# Patient Record
Sex: MALE | Race: WHITE | Employment: OTHER | RURAL
[De-identification: names, ages, dates, MRNs, and addresses within clinical notes are randomized per-mention and may not be internally consistent; named-entity substitution may affect disease eponyms.]

---

## 2017-04-28 RX ORDER — LISINOPRIL 10 MG/1
10 TABLET ORAL DAILY
Qty: 90 TAB | Refills: 0 | Status: SHIPPED | OUTPATIENT
Start: 2017-04-28 | End: 2017-07-20 | Stop reason: SDUPTHER

## 2017-07-20 RX ORDER — SIMVASTATIN 20 MG/1
TABLET, FILM COATED ORAL
Qty: 30 TAB | Refills: 11 | Status: SHIPPED | OUTPATIENT
Start: 2017-07-20 | End: 2017-09-15 | Stop reason: SDUPTHER

## 2017-07-20 RX ORDER — LISINOPRIL 10 MG/1
10 TABLET ORAL DAILY
Qty: 90 TAB | Refills: 0 | Status: SHIPPED | OUTPATIENT
Start: 2017-07-20 | End: 2017-09-15 | Stop reason: SDUPTHER

## 2017-07-20 NOTE — TELEPHONE ENCOUNTER
: Please call pt to set up appt. per Juan Thao needs wellness exam and labs after 08/05/17 prior to refills.

## 2017-09-15 ENCOUNTER — OFFICE VISIT (OUTPATIENT)
Dept: FAMILY MEDICINE CLINIC | Age: 72
End: 2017-09-15

## 2017-09-15 VITALS
OXYGEN SATURATION: 96 % | TEMPERATURE: 98.9 F | RESPIRATION RATE: 16 BRPM | SYSTOLIC BLOOD PRESSURE: 132 MMHG | DIASTOLIC BLOOD PRESSURE: 70 MMHG | HEART RATE: 60 BPM | BODY MASS INDEX: 27.16 KG/M2 | HEIGHT: 66 IN | WEIGHT: 169 LBS

## 2017-09-15 DIAGNOSIS — E78.00 HYPERCHOLESTEROLEMIA: ICD-10-CM

## 2017-09-15 DIAGNOSIS — Z96.642 STATUS POST LEFT HIP REPLACEMENT: ICD-10-CM

## 2017-09-15 DIAGNOSIS — Z00.00 MEDICARE ANNUAL WELLNESS VISIT, SUBSEQUENT: Primary | ICD-10-CM

## 2017-09-15 DIAGNOSIS — R73.02 GLUCOSE INTOLERANCE (IMPAIRED GLUCOSE TOLERANCE): ICD-10-CM

## 2017-09-15 DIAGNOSIS — I10 ESSENTIAL HYPERTENSION: ICD-10-CM

## 2017-09-15 DIAGNOSIS — R35.1 NOCTURIA: ICD-10-CM

## 2017-09-15 RX ORDER — LISINOPRIL 10 MG/1
10 TABLET ORAL DAILY
Qty: 90 TAB | Refills: 0 | Status: SHIPPED | OUTPATIENT
Start: 2017-09-15 | End: 2017-10-09 | Stop reason: SDUPTHER

## 2017-09-15 RX ORDER — AMLODIPINE BESYLATE 10 MG/1
10 TABLET ORAL DAILY
Qty: 90 TAB | Refills: 3 | Status: SHIPPED | OUTPATIENT
Start: 2017-09-15 | End: 2018-03-22 | Stop reason: SDUPTHER

## 2017-09-15 RX ORDER — SIMVASTATIN 20 MG/1
TABLET, FILM COATED ORAL
Qty: 90 TAB | Refills: 3 | Status: SHIPPED | OUTPATIENT
Start: 2017-09-15 | End: 2018-03-22 | Stop reason: SDUPTHER

## 2017-09-15 NOTE — PATIENT INSTRUCTIONS

## 2017-09-15 NOTE — PROGRESS NOTES
This is a Subsequent Medicare Annual Wellness Exam (AWV) (Performed 12 months after IPPE or effective date of Medicare Part B enrollment, Once in a lifetime)    I have reviewed the patient's medical history in detail and updated the computerized patient record. History     Past Medical History:   Diagnosis Date    Hypercholesterolemia     Hypertension     Hypokalemia       Past Surgical History:   Procedure Laterality Date    ENDOSCOPY, COLON, DIAGNOSTIC      HX CYST REMOVAL      rt foot x2    HX ORTHOPAEDIC      left ankle fx  rt ankle fx     Current Outpatient Prescriptions   Medication Sig Dispense Refill    simvastatin (ZOCOR) 20 mg tablet Take 1 Tab by mouth nightly. Indications: MIXED HYPERLIPIDEMIA 30 Tab 11    lisinopril (PRINIVIL, ZESTRIL) 10 mg tablet Take 1 Tab by mouth daily. 90 Tab 0    amLODIPine (NORVASC) 10 mg tablet Take 1 Tab by mouth daily. 80 Tab 3     No Known Allergies  Family History   Problem Relation Age of Onset    Diabetes Mother     Dementia Mother     Heart Failure Father     Obesity Brother     Hypertension Brother      Social History   Substance Use Topics    Smoking status: Never Smoker    Smokeless tobacco: Never Used    Alcohol use 7.5 - 9.0 oz/week     15 - 18 drink(s) per week      Comment: mariely     Patient Active Problem List   Diagnosis Code    Hypertension I10    Hypercholesterolemia E78.00    Hypokalemia E87.6    Status post left hip replacement Z96.642    Stress due to illness of family member Z63.79       Depression Risk Factor Screening:     PHQ over the last two weeks 9/15/2017   PHQ Not Done -   Little interest or pleasure in doing things Not at all   Feeling down, depressed or hopeless Not at all   Total Score PHQ 2 0     Alcohol Risk Factor Screening: You average more than 14 drinks a week. Functional Ability and Level of Safety:   Hearing Loss  Minor loss, especially with background noise.      Activities of Daily Living  The home contains: no safety equipment  Patient does total self care    Fall RiskFall Risk Assessment, last 12 mths 9/15/2017   Able to walk? Yes   Fall in past 12 months?  No       Abuse Screen  Patient is not abused    Cognitive Screening   Evaluation of Cognitive Function:  Has your family/caregiver stated any concerns about your memory: no      Patient Care Team   Patient Care Team:  SIL Iglesias as PCP - General    Assessment/Plan   Education and counseling provided:  Are appropriate based on today's review and evaluation        Health Maintenance Due   Topic Date Due    DTaP/Tdap/Td series (1 - Tdap) 11/27/1964    GLAUCOMA SCREENING Q2Y  11/27/2008    INFLUENZA AGE 9 TO ADULT  08/01/2017    MEDICARE YEARLY EXAM  08/06/2017

## 2017-09-15 NOTE — MR AVS SNAPSHOT
Visit Information Date & Time Provider Department Dept. Phone Encounter #  
 9/15/2017  1:30 PM [facility] [phone] [id number] Upcoming Health Maintenance Date Due DTaP/Tdap/Td series (1 - Tdap) 11/27/1964 GLAUCOMA SCREENING Q2Y 11/27/2008 INFLUENZA AGE 9 TO ADULT 8/1/2017 MEDICARE YEARLY EXAM 8/6/2017 COLONOSCOPY 4/13/2021 Allergies as of 9/15/2017  Review Complete On: 9/15/2017 By: SIL Jones No Known Allergies Current Immunizations  Reviewed on 9/15/2017 Name Date Influenza High Dose Vaccine PF 9/26/2016 Influenza Vaccine 10/1/2014  1:41 PM, 9/17/2013, 9/17/2012 Influenza Vaccine Sal Dubonnet) 10/1/2015 Pneumococcal Conjugate (PCV-13) 8/5/2016 Pneumococcal Polysaccharide (PPSV-23) 9/17/2013 Zoster Vaccine, Live 10/13/2009 Reviewed by SIL Jones on 9/15/2017 at  1:34 PM  
You Were Diagnosed With   
  
 Codes Comments Medicare annual wellness visit, subsequent    -  Primary ICD-10-CM: Z00.00 ICD-9-CM: V70.0 Essential hypertension     ICD-10-CM: I10 
ICD-9-CM: 401.9 Hypercholesterolemia     ICD-10-CM: E78.00 ICD-9-CM: 272.0 Status post left hip replacement     ICD-10-CM: V41.727 ICD-9-CM: V43.64 Glucose intolerance (impaired glucose tolerance)     ICD-10-CM: R73.02 
ICD-9-CM: 790.22 Nocturia     ICD-10-CM: R35.1 ICD-9-CM: 788.43 Vitals BP Pulse Temp Resp Height(growth percentile) 132/70 (BP 1 Location: Left arm, BP Patient Position: Sitting) 60 98.9 °F (37.2 °C) (Temporal) 16 5' 6\" (1.676 m) Weight(growth percentile) SpO2 BMI Smoking Status 169 lb (76.7 kg) 96% 27.28 kg/m2 Never Smoker Vitals History BMI and BSA Data Body Mass Index Body Surface Area  
 27.28 kg/m 2 1.89 m 2 Preferred Pharmacy Pharmacy Name Phone  [facility] [facility], [address] Ricka Panda [phone] Your Updated Medication List  
  
   
This list is accurate as of: 9/15/17  1:57 PM.  Always use your most recent med list. amLODIPine 10 mg tablet Commonly known as:  Grubbs Inks Take 1 Tab by mouth daily. lisinopril 10 mg tablet Commonly known as:  Kavita Drought Take 1 Tab by mouth daily. simvastatin 20 mg tablet Commonly known as:  ZOCOR Take 1 Tab by mouth nightly. Indications: MIXED HYPERLIPIDEMIA Prescriptions Sent to Pharmacy Refills  
 lisinopril (PRINIVIL, ZESTRIL) 10 mg tablet 0 Sig: Take 1 Tab by mouth daily. Class: Normal  
 Pharmacy: [address] [place] [address], [address] [place], [place] [address]phyllis Hester Ph #: [phone] Route: Oral  
 amLODIPine (NORVASC) 10 mg tablet 3 Sig: Take 1 Tab by mouth daily. Class: Normal  
 Pharmacy: [address] [place] [address], [address] [place], [place] [address]phyllis Hester Ph #: [phone] Route: Oral  
 simvastatin (ZOCOR) 20 mg tablet 3 Sig: Take 1 Tab by mouth nightly. Indications: MIXED HYPERLIPIDEMIA Class: Normal  
 Pharmacy: [address] [place] [address], [address] [place], [place] [address]phyllis Hester Ph #: [phone] We Performed the Following CBC WITH AUTOMATED DIFF [53267 CPT(R)] HEMOGLOBIN A1C WITH EAG [47225 CPT(R)] LIPID PANEL WITH LDL/HDL RATIO [38056 CPT(R)] METABOLIC PANEL, COMPREHENSIVE [76560 CPT(R)] PSA, DIAGNOSTIC (PROSTATE SPECIFIC AG) [id number] CPT(R)] Patient Instructions Medicare Wellness Visit, Male The best way to live healthy is to have a healthy lifestyle by eating a well-balanced diet, exercising regularly, limiting alcohol and stopping smoking. Regular physical exams and screening tests are another way to keep healthy. Preventive exams provided by your health care provider can find health problems before they become diseases or illnesses.  Preventive services including immunizations, screening tests, monitoring and exams can help you take care of your own health. All people over age 72 should have a pneumovax  and and a prevnar shot to prevent pneumonia. These are once in a lifetime unless you and your provider decide differently. All people over 65 should have a yearly flu shot and a tetanus vaccine every 10 years. Screening for diabetes mellitus with a blood sugar test should be done every year. Glaucoma is a disease of the eye due to increased ocular pressure that can lead to blindness and it should be done every year by an eye professional. 
 
Cardiovascular screening tests that check for elevated lipids (fatty part of blood) which can lead to heart disease and strokes should be done every 5 years. Colorectal screening that evaluates for blood or polyps in your colon should be done yearly as a stool test or every five years as a flexible sigmoidoscope or every 10 years as a colonoscopy up to age 76. Men up to age 76 may need a screening blood test for prostate cancer at certain intervals, depending on their personal and family history. This decision is between the patient and his provider. If you have been a smoker or had family history of abdominal aortic aneurysms, you and your provider may decide to schedule an ultrasound test of your aorta. Hepatitis C screening is also recommended for anyone born between [birth year] through [address]. A shingles vaccine is also recommended once in a lifetime after age 61. Your Medicare Wellness Exam is recommended annually. Here is a list of your current Health Maintenance items with a due date: 
Health Maintenance Due Topic Date Due  
 DTaP/Tdap/Td  (1 - Tdap) 11/27/1964  Glaucoma Screening   11/27/2008  Flu Vaccine  08/01/2017 [address] [place] [address] Annual Well Visit  08/06/2017 Introducing hospitals & HEALTH SERVICES!    
 [facility] introduces [employer] patient portal. Now you can access parts of your medical record, email your doctor's office, and request medication refills online. 1. In your internet browser, go to [email or web address] 2. Click on the First Time User? Click Here link in the Sign In box. You will see the New Member Sign Up page. 3. Enter your [employer] Access Code exactly as it appears below. You will not need to use this code after youve completed the sign-up process. If you do not sign up before the expiration date, you must request a new code. · [employer] Access Code: [email or web address] Expires: 12/14/2017  1:57 PM 
 
4. Enter the last four digits of your Social Security Number (xxxx) and Date of Birth (mm/dd/yyyy) as indicated and click Submit. You will be taken to the next sign-up page. 5. Create a NextBio ID. This will be your NextBio login ID and cannot be changed, so think of one that is secure and easy to remember. 6. Create a Next[employer] password. You can change your password at any time. 7. Enter your Password Reset Question and Answer. This can be used at a later time if you forget your password. 8. Enter your e-mail address. You will receive e-mail notification when new information is available in [address] 9. Click Sign Up. You can now view and download portions of your medical record. 10. Click the Download Summary menu link to download a portable copy of your medical information. If you have questions, please visit the Frequently Asked Questions section of the [employer] website. Remember, Next[employer] is NOT to be used for urgent needs. For medical emergencies, dial 911. Now available from your iPhone and Android! Please provide this summary of care documentation to your next provider. Your primary care clinician is listed as Jason Vincent. If you have any questions after today's visit, please call [phone].

## 2017-09-15 NOTE — PROGRESS NOTES
[address]   [phone]     9/15/2017  Chief Complaint   Patient presents with    Annual Wellness Visit            HPI   is a 66 y.o. 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 Review of Systems   Constitutional: Negative. Cardiovascular: Negative. Negative for chest pain and palpitations. Gastrointestinal: Negative. Neurological: Negative. Past Medical History:   Diagnosis Date    Hypercholesterolemia     Hypertension     Hypokalemia        No Known Allergies    Current Outpatient Prescriptions   Medication Sig    lisinopril (PRINIVIL, ZESTRIL) 10 mg tablet Take 1 Tab by mouth daily.  amLODIPine (NORVASC) 10 mg tablet Take 1 Tab by mouth daily.  simvastatin (ZOCOR) 20 mg tablet Take 1 Tab by mouth nightly. Indications: MIXED HYPERLIPIDEMIA     No current facility-administered medications for this visit. Lab Results   Component Value Date/Time    Hemoglobin A1c 5.9 08/05/2016 01:41 PM    Hemoglobin A1c 5.8 06/25/2015 11:53 AM    Glucose 94 08/05/2016 01:41 PM    LDL, calculated 76 08/05/2016 01:41 PM    Creatinine 0.68 08/05/2016 01:41 PM     Lab Results   Component Value Date/Time    Cholesterol, total 187 08/05/2016 01:41 PM    HDL Cholesterol 73 08/05/2016 01:41 PM    LDL, calculated 76 08/05/2016 01:41 PM    VLDL, calculated 38 08/05/2016 01:41 PM    Triglyceride 192 08/05/2016 01:41 PM         Visit Vitals    /70 (BP 1 Location: Left arm, BP Patient Position: Sitting)    Pulse 60    Temp 98.9 °F (37.2 °C) (Temporal)    Resp 16    Ht 5' 6\" (1.676 m)    Wt 169 lb (76.7 kg)    SpO2 96%    BMI 27.28 kg/m2     Physical Exam   Cardiovascular: Normal heart sounds. Pulmonary/Chest: Effort normal and breath sounds normal.   Abdominal: Soft. There is no tenderness. Vitals reviewed. ICD-10-CM ICD-9-CM    1. Medicare annual wellness visit, subsequent Z00.00 V70.0    2. Essential hypertension I10 401.9 lisinopril (PRINIVIL, ZESTRIL) 10 mg tablet      amLODIPine (NORVASC) 10 mg tablet      METABOLIC PANEL, COMPREHENSIVE   3. Hypercholesterolemia E78.00 272.0 simvastatin (ZOCOR) 20 mg tablet      CBC WITH AUTOMATED DIFF      LIPID PANEL WITH LDL/HDL RATIO   4. Status post left hip replacement Z96.642 V43.64    5. Glucose intolerance (impaired glucose tolerance) R73.02 790.22 HEMOGLOBIN A1C WITH EAG   6. Nocturia R35.1 788.43 PSA, DIAGNOSTIC (PROSTATE SPECIFIC AG)           Follow-up Disposition:  Return in about 1 year (around 9/15/2018).       Rajni Sanchez PA-C, P

## 2017-09-16 LAB
ALBUMIN SERPL-MCNC: 4.4 G/DL (ref 3.5–4.8)
ALBUMIN/GLOB SERPL: 1.6 {RATIO} (ref 1.2–2.2)
ALP SERPL-CCNC: 71 IU/L (ref 39–117)
ALT SERPL-CCNC: 36 IU/L (ref 0–44)
AST SERPL-CCNC: 37 IU/L (ref 0–40)
BASOPHILS # BLD AUTO: 0 X10E3/UL (ref 0–0.2)
BASOPHILS NFR BLD AUTO: 0 %
BILIRUB SERPL-MCNC: 0.7 MG/DL (ref 0–1.2)
BUN SERPL-MCNC: 13 MG/DL (ref 8–27)
BUN/CREAT SERPL: 17 (ref 10–24)
CALCIUM SERPL-MCNC: 9.8 MG/DL (ref 8.6–10.2)
CHLORIDE SERPL-SCNC: 99 MMOL/L (ref 96–106)
CHOLEST SERPL-MCNC: 206 MG/DL (ref 100–199)
CO2 SERPL-SCNC: 22 MMOL/L (ref 18–29)
CREAT SERPL-MCNC: 0.76 MG/DL (ref 0.76–1.27)
EOSINOPHIL # BLD AUTO: 0.1 X10E3/UL (ref 0–0.4)
EOSINOPHIL NFR BLD AUTO: 1 %
ERYTHROCYTE [DISTWIDTH] IN BLOOD BY AUTOMATED COUNT: 13.4 % (ref 12.3–15.4)
EST. AVERAGE GLUCOSE BLD GHB EST-MCNC: 111 MG/DL
GLOBULIN SER CALC-MCNC: 2.8 G/DL (ref 1.5–4.5)
GLUCOSE SERPL-MCNC: 97 MG/DL (ref 65–99)
HBA1C MFR BLD: 5.5 % (ref 4.8–5.6)
HCT VFR BLD AUTO: 46.8 % (ref 37.5–51)
HDLC SERPL-MCNC: 60 MG/DL
HGB BLD-MCNC: 15.7 G/DL (ref 12.6–17.7)
IMM GRANULOCYTES # BLD: 0 X10E3/UL (ref 0–0.1)
IMM GRANULOCYTES NFR BLD: 0 %
LDLC SERPL CALC-MCNC: 88 MG/DL (ref 0–99)
LDLC/HDLC SERPL: 1.5 RATIO UNITS (ref 0–3.6)
LYMPHOCYTES # BLD AUTO: 1.6 X10E3/UL (ref 0.7–3.1)
LYMPHOCYTES NFR BLD AUTO: 28 %
MCH RBC QN AUTO: 31.3 PG (ref 26.6–33)
MCHC RBC AUTO-ENTMCNC: 33.5 G/DL (ref 31.5–35.7)
MCV RBC AUTO: 93 FL (ref 79–97)
MONOCYTES # BLD AUTO: 0.6 X10E3/UL (ref 0.1–0.9)
MONOCYTES NFR BLD AUTO: 10 %
NEUTROPHILS # BLD AUTO: 3.5 X10E3/UL (ref 1.4–7)
NEUTROPHILS NFR BLD AUTO: 61 %
PLATELET # BLD AUTO: 226 X10E3/UL (ref 150–379)
POTASSIUM SERPL-SCNC: 4.1 MMOL/L (ref 3.5–5.2)
PROT SERPL-MCNC: 7.2 G/DL (ref 6–8.5)
PSA SERPL-MCNC: 1.4 NG/ML (ref 0–4)
RBC # BLD AUTO: 5.02 X10E6/UL (ref 4.14–5.8)
SODIUM SERPL-SCNC: 140 MMOL/L (ref 134–144)
TRIGL SERPL-MCNC: 290 MG/DL (ref 0–149)
VLDLC SERPL CALC-MCNC: 58 MG/DL (ref 5–40)
WBC # BLD AUTO: 5.7 X10E3/UL (ref 3.4–10.8)

## 2017-09-21 NOTE — PROGRESS NOTES
Blood work is very good overall  Blood sugar levels are within normal limits  Cholesterol, triglyceride and LDL are elevated and have increased, however the HDL ( good cholesterol) is excellent balancing the ratio to within normal limits. Minimize saturated fats in your diet.

## 2017-10-09 ENCOUNTER — CLINICAL SUPPORT (OUTPATIENT)
Dept: FAMILY MEDICINE CLINIC | Age: 72
End: 2017-10-09

## 2017-10-09 DIAGNOSIS — Z23 ENCOUNTER FOR IMMUNIZATION: Primary | ICD-10-CM

## 2017-10-09 DIAGNOSIS — I10 ESSENTIAL HYPERTENSION: ICD-10-CM

## 2017-10-09 NOTE — MR AVS SNAPSHOT
Visit Information Date & Time Provider Department Dept. Phone Encounter #  
 10/9/2017  9:45 AM [facility] NURSE [address] [facility] [phone] [id number] Upcoming Health Maintenance Date Due DTaP/Tdap/Td series (1 - Tdap) 11/27/1966 GLAUCOMA SCREENING Q2Y 11/27/2008 INFLUENZA AGE 9 TO ADULT 8/1/2017 MEDICARE YEARLY EXAM 9/16/2018 COLONOSCOPY 4/13/2021 Allergies as of 10/9/2017  Review Complete On: 9/15/2017 By: SIL Li No Known Allergies Current Immunizations  Reviewed on 9/15/2017 Name Date Influenza High Dose Vaccine PF  Incomplete, 9/26/2016 Influenza Vaccine 10/1/2014  1:41 PM, 9/17/2013, 9/17/2012 Influenza Vaccine Donnita Saltness) 10/1/2015 Pneumococcal Conjugate (PCV-13) 8/5/2016 Pneumococcal Polysaccharide (PPSV-23) 9/17/2013 Zoster Vaccine, Live 10/13/2009 Not reviewed this visit You Were Diagnosed With   
  
 Codes Comments Encounter for immunization    -  Primary ICD-10-CM: G19 ICD-9-CM: V03.89 Vitals Smoking Status Never Smoker Preferred Pharmacy Pharmacy Name Phone [address] [place] [address], [address] [place] [address] [facility] [phone] Your Updated Medication List  
  
   
This list is accurate as of: 10/9/17  9:54 AM.  Always use your most recent med list. amLODIPine 10 mg tablet Commonly known as:  Elise Pace Take 1 Tab by mouth daily. lisinopril 10 mg tablet Commonly known as:  Clark Tung Take 1 Tab by mouth daily. simvastatin 20 mg tablet Commonly known as:  ZOCOR Take 1 Tab by mouth nightly. Indications: MIXED HYPERLIPIDEMIA We Performed the Following ADMIN INFLUENZA VIRUS VAC [ HCPCS] INFLUENZA VIRUS VACCINE, HIGH DOSE SEASONAL, PRESERVATIVE FREE [87934 CPT(R)] [facility] [place] [facility]! [address] [facility] introduces [employer] patient portal. Now you can access parts of your medical record, email your doctor's office, and request medication refills online. 1. In your internet browser, go to [email or web address] 2. Click on the First Time User? Click Here link in the Sign In box. You will see the New Member Sign Up page. 3. Enter your [employer] Access Code exactly as it appears below. You will not need to use this code after youve completed the sign-up process. If you do not sign up before the expiration date, you must request a new code. · [employer] Access Code: [email or web address] Expires: 12/14/2017  1:57 PM 
 
4. Enter the last four digits of your Social Security Number (xxxx) and Date of Birth (mm/dd/yyyy) as indicated and click Submit. You will be taken to the next sign-up page. 5. Create a [employer] ID. This will be your [employer] login ID and cannot be changed, so think of one that is secure and easy to remember. 6. Create a [employer] password. You can change your password at any time. 7. Enter your Password Reset Question and Answer. This can be used at a later time if you forget your password. 8. Enter your e-mail address. You will receive e-mail notification when new information is available in [address] 9. Click Sign Up. You can now view and download portions of your medical record. 10. Click the Download Summary menu link to download a portable copy of your medical information. If you have questions, please visit the Frequently Asked Questions section of the [employer] website. Remember, [employer] is NOT to be used for urgent needs. For medical emergencies, dial 911. Now available from your iPhone and Android! Please provide this summary of care documentation to your next provider. Your primary care clinician is listed as María Hernandez. If you have any questions after today's visit, please call [phone].

## 2017-10-13 RX ORDER — LISINOPRIL 10 MG/1
10 TABLET ORAL DAILY
Qty: 90 TAB | Refills: 0 | Status: SHIPPED | OUTPATIENT
Start: 2017-10-13 | End: 2018-03-22 | Stop reason: SDUPTHER

## 2018-03-22 ENCOUNTER — OFFICE VISIT (OUTPATIENT)
Dept: FAMILY MEDICINE CLINIC | Age: 73
End: 2018-03-22

## 2018-03-22 VITALS
DIASTOLIC BLOOD PRESSURE: 78 MMHG | RESPIRATION RATE: 14 BRPM | TEMPERATURE: 98.9 F | HEART RATE: 87 BPM | HEIGHT: 66 IN | WEIGHT: 179 LBS | SYSTOLIC BLOOD PRESSURE: 142 MMHG | BODY MASS INDEX: 28.77 KG/M2 | OXYGEN SATURATION: 97 %

## 2018-03-22 DIAGNOSIS — Z23 ENCOUNTER FOR IMMUNIZATION: ICD-10-CM

## 2018-03-22 DIAGNOSIS — E78.00 HYPERCHOLESTEROLEMIA: ICD-10-CM

## 2018-03-22 DIAGNOSIS — I10 ESSENTIAL HYPERTENSION: Primary | ICD-10-CM

## 2018-03-22 DIAGNOSIS — K63.5 POLYP OF COLON, UNSPECIFIED PART OF COLON, UNSPECIFIED TYPE: ICD-10-CM

## 2018-03-22 LAB
ALBUMIN UR QL STRIP: 80 MG/L
CREATININE, URINE POC: 200 MG/DL
MICROALBUMIN/CREAT RATIO POC: NORMAL MG/G

## 2018-03-22 RX ORDER — SIMVASTATIN 20 MG/1
20 TABLET, FILM COATED ORAL
Qty: 90 TAB | Refills: 3 | Status: SHIPPED | OUTPATIENT
Start: 2018-03-22 | End: 2019-05-03 | Stop reason: SDUPTHER

## 2018-03-22 RX ORDER — AMLODIPINE BESYLATE 10 MG/1
10 TABLET ORAL DAILY
Qty: 90 TAB | Refills: 3 | Status: SHIPPED | OUTPATIENT
Start: 2018-03-22 | End: 2019-06-11 | Stop reason: SDUPTHER

## 2018-03-22 RX ORDER — LISINOPRIL 10 MG/1
10 TABLET ORAL DAILY
Qty: 90 TAB | Refills: 3 | Status: SHIPPED | OUTPATIENT
Start: 2018-03-22 | End: 2018-03-23 | Stop reason: SDUPTHER

## 2018-03-22 NOTE — PROGRESS NOTES
Suma Lorenzo is a 74 y.o. male presenting for/with:    Hypertension    HPI:  Hypertension. Blood pressures have been worsening a little. Management at last visit included continuing current regimen . Current regimen: ACE inhibitor and calcium channel blocker. Symptoms include no symptoms. Patient denies chest pain, palpitations, peripheral edema. Lab review:   Lab Results   Component Value Date/Time    Sodium 140 09/15/2017 01:48 PM    Potassium 4.1 09/15/2017 01:48 PM    Chloride 99 09/15/2017 01:48 PM    CO2 22 09/15/2017 01:48 PM    Glucose 97 09/15/2017 01:48 PM    BUN 13 09/15/2017 01:48 PM    Creatinine 0.76 09/15/2017 01:48 PM    BUN/Creatinine ratio 17 09/15/2017 01:48 PM    GFR est  09/15/2017 01:48 PM    GFR est non-AA 91 09/15/2017 01:48 PM    Calcium 9.8 09/15/2017 01:48 PM     Hyperlipidemia. On simvastatin 20mg  qd. Shanon well. No myalgias, arthralgias, unusual weakness. Lab Results   Component Value Date/Time    Cholesterol, total 206 (H) 09/15/2017 01:48 PM    HDL Cholesterol 60 09/15/2017 01:48 PM    LDL, calculated 88 09/15/2017 01:48 PM    VLDL, calculated 58 (H) 09/15/2017 01:48 PM    Triglyceride 290 (H) 09/15/2017 01:48 PM     Lab Results   Component Value Date/Time    ALT (SGPT) 36 09/15/2017 01:48 PM    AST (SGOT) 37 09/15/2017 01:48 PM    Alk. phosphatase 71 09/15/2017 01:48 PM    Bilirubin, total 0.7 09/15/2017 01:48 PM     PMH, SH, Medications/Allergies: reviewed, on chart.     ROS:  Constitutional: No fever, chills or weight loss  Respiratory: No cough, SOB   CV: No chest pain or Palpitations    Visit Vitals    /68 (BP 1 Location: Left arm, BP Patient Position: Sitting)    Pulse 87    Temp 98.9 °F (37.2 °C) (Temporal)    Resp 14    Ht 5' 6\" (1.676 m)    Wt 179 lb (81.2 kg)    SpO2 97%    BMI 28.89 kg/m2     Wt Readings from Last 3 Encounters:   03/22/18 179 lb (81.2 kg)   09/15/17 169 lb (76.7 kg)   08/05/16 174 lb (78.9 kg)   +10#    BP Readings from Last 3 Encounters:   03/22/18 150/68   09/15/17 132/70   08/05/16 140/76     Physical Examination: General appearance - alert, well appearing, and in no distress  Mental status - alert, oriented to person, place, and time  Eyes - pupils equal and reactive, extraocular eye movements intact  ENT - bilateral external ears and nose normal. Normal lips  Neck - supple, no significant adenopathy, no thyromegaly or mass  Lymphatics - no palpable lymphadenopathy, no hepatosplenomegaly  Chest - clear to auscultation, no wheezes, rales or rhonchi, symmetric air entry  Heart - normal rate, regular rhythm, normal S1, S2, no murmurs, rubs, clicks or gallops  Extremities - peripheral pulses normal, no pedal edema, no clubbing or cyanosis    A/P:  HTN  Not in goal, but close. Up from last visit, but likely due to gaining weight, but not water weight. Check BP and darren/cr. If worsening GFR or climbing BP's, plan boost to lisinopril 20mg every day. HLD  In goal. con't current tx. Hx colon polyps  In 2011. Due for recheck. Will set up with Kacie per pt pref. Vaccinations  Would like shingrix. Ordered.     F/U 6mo

## 2018-03-22 NOTE — MR AVS SNAPSHOT
[address] [place] [address] [address] 
[phone] Patient: Pedrito Corado MRN: [id number] :1945 Visit Information Date & Time Provider Department Dept. Phone Encounter #  
 3/22/2018  7:30 AM Guanakito HansonNeisha 72 [phone] [id number] Follow-up Instructions Return in about 6 months (around 2018). Upcoming Health Maintenance Date Due DTaP/Tdap/Td series (1 - Tdap) 1964 GLAUCOMA SCREENING Q2Y 2008 MEDICARE YEARLY EXAM 2018 COLONOSCOPY 2021 Allergies as of 3/22/2018  Review Complete On: 3/22/2018 By: Guanakito Hanson MD  
 No Known Allergies Current Immunizations  Reviewed on 9/15/2017 Name Date Influenza High Dose Vaccine PF 10/9/2017, 2016 Influenza Vaccine 10/1/2014  1:41 PM, 2013, 2012 Influenza Vaccine [place] Sprinkle) 10/1/2015 Pneumococcal Conjugate (PCV-13) 2016 Pneumococcal Polysaccharide (PPSV-23) 2013 Zoster Vaccine, Live 10/13/2009 Not reviewed this visit You Were Diagnosed With   
  
 Codes Comments Essential hypertension    -  Primary ICD-10-CM: I10 
ICD-9-CM: 401.9 Hypercholesterolemia     ICD-10-CM: E78.00 ICD-9-CM: 272.0 Encounter for immunization     ICD-10-CM: H25 ICD-9-CM: V03.89 Polyp of colon, unspecified part of colon, unspecified type     ICD-10-CM: K63.5 ICD-9-CM: 211.3 Vitals BP Pulse Temp Resp Height(growth percentile) 142/78 (BP 1 Location: Right arm, BP Patient Position: Sitting) 87 98.9 °F (37.2 °C) (Temporal) 14 5' 6\" (1.676 m) Weight(growth percentile) SpO2 BMI Smoking Status 179 lb (81.2 kg) 97% 28.89 kg/m2 Never Smoker Vitals History BMI and BSA Data Body Mass Index Body Surface Area  
 28.89 kg/m 2 1.94 m 2 Preferred Pharmacy Pharmacy Name Phone [address] [place] [address] [phone] Your Updated Medication List  
  
   
This list is accurate as of 3/22/18  8:19 AM.  Always use your most recent med list. amLODIPine 10 mg tablet Commonly known as:  Ramonita Ortizan Take 1 Tab by mouth daily. Indications: pressure  
  
 lisinopril 10 mg tablet Commonly known as:  Juan Jose Castro Take 1 Tab by mouth daily. Indications: pressure PRESERVISION AREDS 2 PO Take  by mouth. simvastatin 20 mg tablet Commonly known as:  ZOCOR Take 1 Tab by mouth nightly. Indications: cholesterol and heart  
  
 varicella-zoster recombinant (PF) 50 mcg/0.5 mL Susr injection Commonly known as:  SHINGRIX  
0.5 mL by IntraMUSCular route once for 1 dose. Indications: prevent shingles Prescriptions Printed Refills  
 varicella-zoster recombinant, PF, (SHINGRIX) 50 mcg/0.5 mL susr injection 0 Si.5 mL by IntraMUSCular route once for 1 dose. Indications: prevent shingles Class: Print Route: IntraMUSCular Prescriptions Sent to Pharmacy Refills  
 amLODIPine (NORVASC) 10 mg tablet 3 Sig: Take 1 Tab by mouth daily. Indications: pressure Class: Normal  
 Pharmacy: [address] [place] [address] Ph #: [phone] Route: Oral  
 lisinopril (PRINIVIL, ZESTRIL) 10 mg tablet 3 Sig: Take 1 Tab by mouth daily. Indications: pressure Class: Normal  
 Pharmacy: [address] [place] [address] Ph #: [phone] Route: Oral  
 simvastatin (ZOCOR) 20 mg tablet 3 Sig: Take 1 Tab by mouth nightly. Indications: cholesterol and heart Class: Normal  
 Pharmacy: [address] [place] [address] Ph #: [phone] Route: Oral  
  
We Performed the Following AMB POC URINE, MICROALBUMIN, SEMIQUANT (3 RESULTS) [10749 CPT(R)] METABOLIC PANEL, BASIC [53584 CPT(R)] REFERRAL TO GENERAL SURGERY [REF27 Custom] Comments: Hx polyps on scope with Merged with [facility] 2011. Follow-up Instructions Return in about 6 months (around 9/22/2018). Referral Information Referral ID Referred By Referred To  
  
 [id number] [place], [facility] Jamel Arteaga MD   
   [address] [facility] [address] Phone: [phone] Fax: [phone] Visits Status Start Date End Date 1 New Request 3/22/18 3/22/19 If your referral has a status of pending review or denied, additional information will be sent to support the outcome of this decision. Introducing [facility] SERVICES! Hilda Laird introduces [employer]ela patient portal. Now you can access parts of your medical record, email your doctor's office, and request medication refills online. 1. In your internet browser, go to [email or web address] 2. Click on the First Time User? Click Here link in the Sign In box. You will see the New Member Sign Up page. 3. Enter your Axela Access Code exactly as it appears below. You will not need to use this code after youve completed the sign-up process. If you do not sign up before the expiration date, you must request a new code. · Axela Access Code: [email or web address] Expires: 6/20/2018  8:19 AM 
 
4. Enter the last four digits of your Social Security Number (xxxx) and Date of Birth (mm/dd/yyyy) as indicated and click Submit. You will be taken to the next sign-up page. 5. Create a [employer] ID. This will be your Axela login ID and cannot be changed, so think of one that is secure and easy to remember. 6. Create a [employer] password. You can change your password at any time. 7. Enter your Password Reset Question and Answer. This can be used at a later time if you forget your password. 8. Enter your e-mail address.  You will receive e-mail notification when new information is available in [employer]. 9. Click Sign Up. You can now view and download portions of your medical record. 10. Click the Download Summary menu link to download a portable copy of your medical information. If you have questions, please visit the Frequently Asked Questions section of the [employer] website. Remember, [employer] is NOT to be used for urgent needs. For medical emergencies, dial 911. Now available from your iPhone and Android! Please provide this summary of care documentation to your next provider. Your primary care clinician is listed as Vickie Alvarez. If you have any questions after today's visit, please call [phone].

## 2018-03-23 LAB
BUN SERPL-MCNC: 13 MG/DL (ref 8–27)
BUN/CREAT SERPL: 15 (ref 10–24)
CALCIUM SERPL-MCNC: 9.5 MG/DL (ref 8.6–10.2)
CHLORIDE SERPL-SCNC: 97 MMOL/L (ref 96–106)
CO2 SERPL-SCNC: 26 MMOL/L (ref 18–29)
CREAT SERPL-MCNC: 0.89 MG/DL (ref 0.76–1.27)
GFR SERPLBLD CREATININE-BSD FMLA CKD-EPI: 84 ML/MIN/1.73
GFR SERPLBLD CREATININE-BSD FMLA CKD-EPI: 97 ML/MIN/1.73
GLUCOSE SERPL-MCNC: 111 MG/DL (ref 65–99)
POTASSIUM SERPL-SCNC: 4.4 MMOL/L (ref 3.5–5.2)
SODIUM SERPL-SCNC: 139 MMOL/L (ref 134–144)

## 2018-03-23 RX ORDER — LISINOPRIL 20 MG/1
20 TABLET ORAL DAILY
Qty: 90 TAB | Refills: 3 | Status: SHIPPED | OUTPATIENT
Start: 2018-03-23 | End: 2019-03-11 | Stop reason: SDUPTHER

## 2018-04-05 ENCOUNTER — OFFICE VISIT (OUTPATIENT)
Dept: SURGERY | Age: 73
End: 2018-04-05

## 2018-04-05 VITALS
DIASTOLIC BLOOD PRESSURE: 66 MMHG | HEIGHT: 66 IN | WEIGHT: 180.8 LBS | HEART RATE: 83 BPM | BODY MASS INDEX: 29.06 KG/M2 | SYSTOLIC BLOOD PRESSURE: 141 MMHG

## 2018-04-05 DIAGNOSIS — Z86.010 HX OF COLONIC POLYP: Primary | ICD-10-CM

## 2018-04-05 RX ORDER — SODIUM CHLORIDE, SODIUM LACTATE, POTASSIUM CHLORIDE, CALCIUM CHLORIDE 600; 310; 30; 20 MG/100ML; MG/100ML; MG/100ML; MG/100ML
200 INJECTION, SOLUTION INTRAVENOUS CONTINUOUS
Status: CANCELLED | OUTPATIENT
Start: 2018-04-05 | End: 2018-04-06

## 2018-04-05 RX ORDER — BISACODYL 5 MG
TABLET, DELAYED RELEASE (ENTERIC COATED) ORAL
Qty: 1 TAB | Refills: 0 | Status: SHIPPED | OUTPATIENT
Start: 2018-04-05 | End: 2018-04-23

## 2018-04-05 RX ORDER — POLYETHYLENE GLYCOL 3350, SODIUM CHLORIDE, SODIUM BICARBONATE, POTASSIUM CHLORIDE 420; 11.2; 5.72; 1.48 G/4L; G/4L; G/4L; G/4L
POWDER, FOR SOLUTION ORAL
Qty: 1 BOTTLE | Refills: 0 | Status: SHIPPED | OUTPATIENT
Start: 2018-04-05 | End: 2018-04-23

## 2018-04-05 NOTE — PROGRESS NOTES
[address] [facility], [address] [facility]  Phone: [phone] Fax: [phone]                                              OFFICE NOTE    NAME: Jamie Devi  : 1945    Chief Complaint:   History of colon polyps    History: Jamie Devi is a 74 y.o. WHITE OR  male referred for colonoscopy. This is  the patient's third colonoscopy. The previous one showed polyps. The last colonoscopy was in . The bowel movements are regular and brown. He does not use any meds on a regular basis for his bowels. He denies any blood in stools or hemorrhoidal disease. Family history is negative for colon cancer or colon polyps. Past Medical History:   Diagnosis Date    Gout     Hypercholesterolemia     Hypertension     Hypokalemia      Past Surgical History:   Procedure Laterality Date    ENDOSCOPY, COLON, DIAGNOSTIC      HX COLONOSCOPY      HX CYST REMOVAL      rt foot x2    HX ORTHOPAEDIC      left ankle fx  rt ankle fx        Current Outpatient Prescriptions   Medication Sig Dispense Refill    peg-electrolyte soln (GAVILYTE-N) 420 gram solution Take as directed  Indications: BOWEL EVACUATION 1 Bottle 0    bisacodyl (DULCOLAX, BISACODYL,) 5 mg EC tablet Take as directed  Indications: BOWEL EVACUATION 1 Tab 0    lisinopril (PRINIVIL, ZESTRIL) 20 mg tablet Take 1 Tab by mouth daily. Indications: pressure 90 Tab 3    VIT C/E/ZN/COPPR/LUTEIN/ZEAXAN (PRESERVISION AREDS 2 PO) Take  by mouth.  amLODIPine (NORVASC) 10 mg tablet Take 1 Tab by mouth daily. Indications: pressure 90 Tab 3    simvastatin (ZOCOR) 20 mg tablet Take 1 Tab by mouth nightly. Indications: cholesterol and heart 90 Tab 3     No Known Allergies  Social History     Social History    Marital status: SINGLE     Spouse name: N/A    Number of children: N/A    Years of education: N/A     Occupational History    Not on file.      Social History Main Topics    Smoking status: Never Smoker    Smokeless tobacco: Never Used    Alcohol use 7.5 - 9.0 oz/week     15 - 18 Standard drinks or equivalent per week      Comment: mariely    Drug use: No    Sexual activity: Not on file     Other Topics Concern    Not on file     Social History Narrative     Family History   Problem Relation Age of Onset    Diabetes Mother     Dementia Mother     Heart Failure Father     Obesity Brother     Hypertension Brother        Patient Active Problem List   Diagnosis Code    Hypertension I10    Hypercholesterolemia E78.00    Status post left hip replacement Z96.642    Stress due to illness of family member Z63.79    Colon polyp K63.5       Review of Systems:  Review of Systems   Constitutional: Negative for chills, fever, malaise/fatigue and weight loss. HENT: Negative for congestion, ear discharge, ear pain, hearing loss, nosebleeds, sore throat and tinnitus. Eyes: Negative for blurred vision, double vision, pain, discharge and redness. Respiratory: Negative for cough, sputum production, shortness of breath and wheezing. Cardiovascular: Negative for chest pain, palpitations and leg swelling. Gastrointestinal: Negative for abdominal pain, blood in stool, constipation, diarrhea, heartburn, melena, nausea and vomiting. Genitourinary: Negative for frequency, hematuria and urgency. Musculoskeletal: Positive for joint pain. Negative for back pain and neck pain. Skin: Positive for rash. Negative for itching. Neurological: Negative for dizziness, tremors, focal weakness, seizures, weakness and headaches. Endo/Heme/Allergies: Bruises/bleeds easily. Psychiatric/Behavioral: Negative for depression and memory loss. The patient is not nervous/anxious and does not have insomnia.         Physical Exam:  Visit Vitals    /66 (BP 1 Location: Left arm, BP Patient Position: Sitting)    Pulse 83    Ht 5' 6\" (1.676 m)    Wt 180 lb 12.8 oz (82 kg)    BMI 29.18 kg/m2       Physical Exam Constitutional: He is oriented to person, place, and time. He appears well-developed and well-nourished. No distress. HENT:   Head: Normocephalic and atraumatic. Right Ear: External ear normal.   Left Ear: External ear normal.   Nose: Nose normal.   Mouth/Throat: Oropharynx is clear and moist. No oropharyngeal exudate. Eyes: EOM are normal. Pupils are equal, round, and reactive to light. Neck: No thyromegaly present. Cardiovascular: Normal rate, regular rhythm and normal heart sounds. Exam reveals no friction rub. No murmur heard. Pulmonary/Chest: Effort normal and breath sounds normal. He has no wheezes. He has no rales. Abdominal: Soft. He exhibits no distension and no mass. There is no tenderness. Musculoskeletal: Normal range of motion. Lymphadenopathy:     He has no cervical adenopathy. Neurological: He is alert and oriented to person, place, and time. Skin: Skin is warm and dry. No rash noted. No erythema. Psychiatric: He has a normal mood and affect. His behavior is normal. Judgment and thought content normal.       Impression:  History of colon polyps    Plan:  Colonoscopy on 3/50/99  Risks and complications were explained to patient and they voiced understanding.     Ander Garcia M.D.

## 2018-04-23 PROBLEM — Z86.010 HX OF COLONIC POLYP: Status: ACTIVE | Noted: 2018-04-23

## 2018-04-23 PROBLEM — K57.30 DIVERTICULA OF COLON: Status: ACTIVE | Noted: 2018-04-23

## 2018-04-23 PROBLEM — K63.5 COLON POLYP: Status: RESOLVED | Noted: 2018-03-22 | Resolved: 2018-04-23

## 2018-04-23 LAB — COLONOSCOPY, EXTERNAL: NORMAL

## 2018-05-01 ENCOUNTER — OFFICE VISIT (OUTPATIENT)
Dept: SURGERY | Age: 73
End: 2018-05-01

## 2018-05-01 VITALS
HEART RATE: 92 BPM | DIASTOLIC BLOOD PRESSURE: 64 MMHG | BODY MASS INDEX: 28.93 KG/M2 | WEIGHT: 180 LBS | HEIGHT: 66 IN | SYSTOLIC BLOOD PRESSURE: 130 MMHG

## 2018-05-01 DIAGNOSIS — K57.30 DIVERTICULA OF COLON: ICD-10-CM

## 2018-05-01 DIAGNOSIS — D12.2 ADENOMATOUS POLYP OF ASCENDING COLON: Primary | ICD-10-CM

## 2018-05-01 DIAGNOSIS — D12.4 ADENOMATOUS POLYP OF DESCENDING COLON: ICD-10-CM

## 2018-05-01 NOTE — PROGRESS NOTES
Follow up from colonoscopy  No c/o      Pathology:  Adenomatous polyp x 2 , one fairly large  diverticula    Visit Vitals    /64 (BP 1 Location: Left arm, BP Patient Position: Sitting)    Pulse 92    Ht 5' 6\" (1.676 m)    Wt 180 lb (81.6 kg)    BMI 29.05 kg/m2     WDWN patient in no acute distress. Pathophysiology and malignancy potential discussed. Pictures viewed and questions answered. High fiber diet suggested. Time spent 15  minutes in discussion, over half the visit was in care coordination and patient counseling. Repeat scope in 3 years.

## 2018-06-29 ENCOUNTER — TELEPHONE (OUTPATIENT)
Dept: FAMILY MEDICINE CLINIC | Age: 73
End: 2018-06-29

## 2018-06-29 DIAGNOSIS — Z23 ENCOUNTER FOR IMMUNIZATION: Primary | ICD-10-CM

## 2019-01-13 PROBLEM — L60.0 INGROWING NAIL WITH INFECTION: Status: ACTIVE | Noted: 2019-01-13

## 2019-01-13 PROBLEM — Z23 ENCOUNTER FOR IMMUNIZATION: Status: ACTIVE | Noted: 2019-01-13

## 2019-03-11 DIAGNOSIS — I10 ESSENTIAL HYPERTENSION: ICD-10-CM

## 2019-03-12 RX ORDER — LISINOPRIL 20 MG/1
TABLET ORAL
Qty: 90 TAB | Refills: 0 | Status: SHIPPED | OUTPATIENT
Start: 2019-03-12 | End: 2019-03-22 | Stop reason: SDUPTHER

## 2019-03-22 PROBLEM — L60.0 INGROWING NAIL WITH INFECTION: Status: RESOLVED | Noted: 2019-01-13 | Resolved: 2019-03-22

## 2019-06-11 DIAGNOSIS — I10 ESSENTIAL HYPERTENSION: ICD-10-CM

## 2019-06-11 RX ORDER — AMLODIPINE BESYLATE 10 MG/1
TABLET ORAL
Qty: 90 TAB | Refills: 0 | Status: SHIPPED | OUTPATIENT
Start: 2019-06-11 | End: 2019-09-17 | Stop reason: SDUPTHER

## 2019-09-04 DIAGNOSIS — I10 ESSENTIAL HYPERTENSION: ICD-10-CM

## 2019-09-04 RX ORDER — LISINOPRIL 20 MG/1
TABLET ORAL
Qty: 90 TAB | Refills: 0 | Status: SHIPPED | OUTPATIENT
Start: 2019-09-04 | End: 2019-10-01 | Stop reason: SDUPTHER

## 2019-09-17 DIAGNOSIS — I10 ESSENTIAL HYPERTENSION: ICD-10-CM

## 2019-09-17 RX ORDER — AMLODIPINE BESYLATE 10 MG/1
TABLET ORAL
Qty: 90 TAB | Refills: 0 | Status: SHIPPED | OUTPATIENT
Start: 2019-09-17 | End: 2019-11-06 | Stop reason: SDUPTHER

## 2019-10-17 ENCOUNTER — APPOINTMENT (OUTPATIENT)
Dept: GENERAL RADIOLOGY | Age: 74
DRG: 074 | End: 2019-10-17
Attending: EMERGENCY MEDICINE
Payer: MEDICARE

## 2019-10-17 ENCOUNTER — APPOINTMENT (OUTPATIENT)
Dept: CT IMAGING | Age: 74
DRG: 074 | End: 2019-10-17
Attending: EMERGENCY MEDICINE
Payer: MEDICARE

## 2019-10-17 ENCOUNTER — HOSPITAL ENCOUNTER (INPATIENT)
Age: 74
LOS: 3 days | Discharge: HOME HEALTH | DRG: 074 | End: 2019-10-21
Attending: EMERGENCY MEDICINE | Admitting: INTERNAL MEDICINE
Payer: MEDICARE

## 2019-10-17 DIAGNOSIS — R26.9 GAIT ABNORMALITY: ICD-10-CM

## 2019-10-17 DIAGNOSIS — F10.10 ALCOHOL ABUSE: Primary | ICD-10-CM

## 2019-10-17 DIAGNOSIS — R25.1 TREMOR: ICD-10-CM

## 2019-10-17 DIAGNOSIS — G62.1 ALCOHOLIC POLYNEUROPATHY (HCC): ICD-10-CM

## 2019-10-17 DIAGNOSIS — R27.0 ATAXIA: ICD-10-CM

## 2019-10-17 DIAGNOSIS — G60.9 IDIOPATHIC PERIPHERAL NEUROPATHY: ICD-10-CM

## 2019-10-17 LAB
ALBUMIN SERPL-MCNC: 3.5 G/DL (ref 3.5–5)
ALBUMIN/GLOB SERPL: 0.9 {RATIO} (ref 1.1–2.2)
ALP SERPL-CCNC: 83 U/L (ref 45–117)
ALT SERPL-CCNC: 47 U/L (ref 12–78)
ANION GAP SERPL CALC-SCNC: 9 MMOL/L (ref 5–15)
AST SERPL-CCNC: 45 U/L (ref 15–37)
BASOPHILS # BLD: 0 K/UL (ref 0–0.1)
BASOPHILS NFR BLD: 0 % (ref 0–1)
BILIRUB SERPL-MCNC: 0.8 MG/DL (ref 0.2–1)
BNP SERPL-MCNC: 129 PG/ML
BUN SERPL-MCNC: 9 MG/DL (ref 6–20)
BUN/CREAT SERPL: 11 (ref 12–20)
CALCIUM SERPL-MCNC: 8.7 MG/DL (ref 8.5–10.1)
CHLORIDE SERPL-SCNC: 103 MMOL/L (ref 97–108)
CO2 SERPL-SCNC: 26 MMOL/L (ref 21–32)
CREAT SERPL-MCNC: 0.85 MG/DL (ref 0.7–1.3)
DIFFERENTIAL METHOD BLD: NORMAL
EOSINOPHIL # BLD: 0 K/UL (ref 0–0.4)
EOSINOPHIL NFR BLD: 0 % (ref 0–7)
ERYTHROCYTE [DISTWIDTH] IN BLOOD BY AUTOMATED COUNT: 12.9 % (ref 11.5–14.5)
ETHANOL SERPL-MCNC: <10 MG/DL
GLOBULIN SER CALC-MCNC: 3.9 G/DL (ref 2–4)
GLUCOSE SERPL-MCNC: 88 MG/DL (ref 65–100)
HCT VFR BLD AUTO: 44.7 % (ref 36.6–50.3)
HGB BLD-MCNC: 14.9 G/DL (ref 12.1–17)
IMM GRANULOCYTES # BLD AUTO: 0 K/UL (ref 0–0.04)
IMM GRANULOCYTES NFR BLD AUTO: 0 % (ref 0–0.5)
LIPASE SERPL-CCNC: 236 U/L (ref 73–393)
LYMPHOCYTES # BLD: 0.9 K/UL (ref 0.8–3.5)
LYMPHOCYTES NFR BLD: 18 % (ref 12–49)
MAGNESIUM SERPL-MCNC: 1.8 MG/DL (ref 1.6–2.4)
MCH RBC QN AUTO: 31.2 PG (ref 26–34)
MCHC RBC AUTO-ENTMCNC: 33.3 G/DL (ref 30–36.5)
MCV RBC AUTO: 93.7 FL (ref 80–99)
MONOCYTES # BLD: 0.6 K/UL (ref 0–1)
MONOCYTES NFR BLD: 12 % (ref 5–13)
NEUTS SEG # BLD: 3.5 K/UL (ref 1.8–8)
NEUTS SEG NFR BLD: 70 % (ref 32–75)
NRBC # BLD: 0 K/UL (ref 0–0.01)
NRBC BLD-RTO: 0 PER 100 WBC
PLATELET # BLD AUTO: 161 K/UL (ref 150–400)
PMV BLD AUTO: 8.9 FL (ref 8.9–12.9)
POTASSIUM SERPL-SCNC: 3.5 MMOL/L (ref 3.5–5.1)
PROT SERPL-MCNC: 7.4 G/DL (ref 6.4–8.2)
RBC # BLD AUTO: 4.77 M/UL (ref 4.1–5.7)
SODIUM SERPL-SCNC: 138 MMOL/L (ref 136–145)
TROPONIN I SERPL-MCNC: <0.05 NG/ML
WBC # BLD AUTO: 5.1 K/UL (ref 4.1–11.1)

## 2019-10-17 PROCEDURE — 36415 COLL VENOUS BLD VENIPUNCTURE: CPT

## 2019-10-17 PROCEDURE — 99285 EMERGENCY DEPT VISIT HI MDM: CPT

## 2019-10-17 PROCEDURE — 83735 ASSAY OF MAGNESIUM: CPT

## 2019-10-17 PROCEDURE — 80307 DRUG TEST PRSMV CHEM ANLYZR: CPT

## 2019-10-17 PROCEDURE — 85025 COMPLETE CBC W/AUTO DIFF WBC: CPT

## 2019-10-17 PROCEDURE — 96366 THER/PROPH/DIAG IV INF ADDON: CPT

## 2019-10-17 PROCEDURE — 70450 CT HEAD/BRAIN W/O DYE: CPT

## 2019-10-17 PROCEDURE — 93005 ELECTROCARDIOGRAM TRACING: CPT

## 2019-10-17 PROCEDURE — 71045 X-RAY EXAM CHEST 1 VIEW: CPT

## 2019-10-17 PROCEDURE — 80053 COMPREHEN METABOLIC PANEL: CPT

## 2019-10-17 PROCEDURE — 96365 THER/PROPH/DIAG IV INF INIT: CPT

## 2019-10-17 PROCEDURE — 84484 ASSAY OF TROPONIN QUANT: CPT

## 2019-10-17 PROCEDURE — 83690 ASSAY OF LIPASE: CPT

## 2019-10-17 PROCEDURE — 74011250636 HC RX REV CODE- 250/636: Performed by: EMERGENCY MEDICINE

## 2019-10-17 PROCEDURE — 74011000250 HC RX REV CODE- 250: Performed by: EMERGENCY MEDICINE

## 2019-10-17 PROCEDURE — 83880 ASSAY OF NATRIURETIC PEPTIDE: CPT

## 2019-10-17 RX ORDER — DIAZEPAM 5 MG/1
5 TABLET ORAL
Status: COMPLETED | OUTPATIENT
Start: 2019-10-17 | End: 2019-10-18

## 2019-10-17 RX ADMIN — FOLIC ACID: 5 INJECTION, SOLUTION INTRAMUSCULAR; INTRAVENOUS; SUBCUTANEOUS at 19:37

## 2019-10-17 NOTE — ED NOTES
Assumed care of patient at this time from [address]. Patient resting in bed comfortably. Monitor x3. Calm and in no apparent distress.

## 2019-10-18 ENCOUNTER — APPOINTMENT (OUTPATIENT)
Dept: NON INVASIVE DIAGNOSTICS | Age: 74
DRG: 074 | End: 2019-10-18
Attending: INTERNAL MEDICINE
Payer: MEDICARE

## 2019-10-18 ENCOUNTER — APPOINTMENT (OUTPATIENT)
Dept: VASCULAR SURGERY | Age: 74
DRG: 074 | End: 2019-10-18
Attending: INTERNAL MEDICINE
Payer: MEDICARE

## 2019-10-18 ENCOUNTER — APPOINTMENT (OUTPATIENT)
Dept: MRI IMAGING | Age: 74
DRG: 074 | End: 2019-10-18
Attending: INTERNAL MEDICINE
Payer: MEDICARE

## 2019-10-18 PROBLEM — I65.23 BILATERAL CAROTID ARTERY STENOSIS: Status: ACTIVE | Noted: 2019-10-18

## 2019-10-18 PROBLEM — R26.9 GAIT ABNORMALITY: Status: ACTIVE | Noted: 2019-10-18

## 2019-10-18 PROBLEM — G45.0 VERTEBROBASILAR ARTERY INSUFFICIENCY: Status: ACTIVE | Noted: 2019-10-18

## 2019-10-18 LAB
25(OH)D3 SERPL-MCNC: 35.1 NG/ML (ref 30–100)
APPEARANCE UR: CLEAR
ATRIAL RATE: 84 BPM
BACTERIA URNS QL MICRO: NEGATIVE /HPF
BILIRUB UR QL: NEGATIVE
CALCULATED R AXIS, ECG10: -15 DEGREES
CALCULATED T AXIS, ECG11: 31 DEGREES
COLOR UR: ABNORMAL
DIAGNOSIS, 93000: NORMAL
ECHO AO ROOT DIAM: 3.56 CM
ECHO AV AREA PEAK VELOCITY: 3.8 CM2
ECHO AV AREA/BSA PEAK VELOCITY: 2 CM2/M2
ECHO AV PEAK GRADIENT: 5 MMHG
ECHO AV PEAK VELOCITY: 111.39 CM/S
ECHO AV REGURGITANT PHT: 858.1 CM
ECHO EST RA PRESSURE: 10 MMHG
ECHO LA AREA 4C: 15.1 CM2
ECHO LA MAJOR AXIS: 2.33 CM
ECHO LA TO AORTIC ROOT RATIO: 0.66
ECHO LA VOL 4C: 33.09 ML (ref 18–58)
ECHO LA VOLUME INDEX A4C: 17.84 ML/M2 (ref 16–28)
ECHO LV E' LATERAL VELOCITY: 8.25 CM/S
ECHO LV E' SEPTAL VELOCITY: 8.69 CM/S
ECHO LV INTERNAL DIMENSION DIASTOLIC: 3.72 CM (ref 4.2–5.9)
ECHO LV INTERNAL DIMENSION SYSTOLIC: 3.39 CM
ECHO LV IVSD: 1.67 CM (ref 0.6–1)
ECHO LV MASS 2D: 197.4 G (ref 88–224)
ECHO LV MASS INDEX 2D: 106.4 G/M2 (ref 49–115)
ECHO LV POSTERIOR WALL DIASTOLIC: 0.95 CM (ref 0.6–1)
ECHO LV POSTERIOR WALL SYSTOLIC: 1.2 CM
ECHO LVOT DIAM: 2.5 CM
ECHO LVOT PEAK GRADIENT: 3 MMHG
ECHO LVOT PEAK VELOCITY: 87.04 CM/S
ECHO LVOT SV: 93.3 ML
ECHO LVOT VTI: 19.06 CM
ECHO MV A VELOCITY: 63.77 CM/S
ECHO MV AREA VTI: 3.4 CM2
ECHO MV E DECELERATION TIME (DT): 276.4 MS
ECHO MV E VELOCITY: 75.42 CM/S
ECHO MV E/A RATIO: 1.18
ECHO MV E/E' LATERAL: 9.14
ECHO MV E/E' RATIO (AVERAGED): 8.91
ECHO MV E/E' SEPTAL: 8.68
ECHO MV MAX VELOCITY: 82.69 CM/S
ECHO MV MEAN GRADIENT: 1.4 MMHG
ECHO MV PEAK GRADIENT: 2.7 MMHG
ECHO MV REGURGITANT PEAK GRADIENT: 7.7 MMHG
ECHO MV REGURGITANT PEAK VELOCITY: 138.79 CM/S
ECHO MV VTI: 27.18 CM
ECHO PULMONARY ARTERY SYSTOLIC PRESSURE (PASP): 32.4 MMHG
ECHO RA AREA 4C: 13.42 CM2
ECHO RIGHT VENTRICULAR SYSTOLIC PRESSURE (RVSP): 32.4 MMHG
ECHO TV REGURGITANT MAX VELOCITY: 236.85 CM/S
ECHO TV REGURGITANT PEAK GRADIENT: 22.4 MMHG
EPITH CASTS URNS QL MICRO: ABNORMAL /LPF
GLUCOSE UR STRIP.AUTO-MCNC: NEGATIVE MG/DL
HGB UR QL STRIP: NEGATIVE
HYALINE CASTS URNS QL MICRO: ABNORMAL /LPF (ref 0–5)
KETONES UR QL STRIP.AUTO: 40 MG/DL
LEFT CCA DIST DIAS: 19.5 CM/S
LEFT CCA DIST SYS: 96.3 CM/S
LEFT CCA PROX DIAS: 8.6 CM/S
LEFT CCA PROX SYS: 87.6 CM/S
LEFT ECA DIAS: 14.5 CM/S
LEFT ECA SYS: 141.8 CM/S
LEFT ICA DIST DIAS: 21.1 CM/S
LEFT ICA DIST SYS: 93.5 CM/S
LEFT ICA MID DIAS: 16.7 CM/S
LEFT ICA MID SYS: 133 CM/S
LEFT ICA PROX DIAS: 32 CM/S
LEFT ICA PROX SYS: 196.6 CM/S
LEFT ICA/CCA SYS: 2
LEFT SUBCLAVIAN DIAS: 0 CM/S
LEFT SUBCLAVIAN SYS: 120.5 CM/S
LEFT VERTEBRAL DIAS: 12.3 CM/S
LEFT VERTEBRAL SYS: 54 CM/S
LEUKOCYTE ESTERASE UR QL STRIP.AUTO: NEGATIVE
NITRITE UR QL STRIP.AUTO: NEGATIVE
PH UR STRIP: 7 [PH] (ref 5–8)
PISA AR MAX VEL: 383.83 CM/S
PROT UR STRIP-MCNC: NEGATIVE MG/DL
Q-T INTERVAL, ECG07: 386 MS
QRS DURATION, ECG06: 82 MS
QTC CALCULATION (BEZET), ECG08: 448 MS
RBC #/AREA URNS HPF: ABNORMAL /HPF (ref 0–5)
RIGHT CCA DIST DIAS: 17.4 CM/S
RIGHT CCA DIST SYS: 97.7 CM/S
RIGHT CCA PROX DIAS: 13.7 CM/S
RIGHT CCA PROX SYS: 79.5 CM/S
RIGHT ECA DIAS: 6.4 CM/S
RIGHT ECA SYS: 79.5 CM/S
RIGHT ICA DIST DIAS: 17.4 CM/S
RIGHT ICA DIST SYS: 61.2 CM/S
RIGHT ICA MID DIAS: 13.7 CM/S
RIGHT ICA MID SYS: 50.2 CM/S
RIGHT ICA PROX DIAS: 11.9 CM/S
RIGHT ICA PROX SYS: 50.2 CM/S
RIGHT ICA/CCA SYS: 0.6
RIGHT SUBCLAVIAN DIAS: 0 CM/S
RIGHT SUBCLAVIAN SYS: 103.2 CM/S
RIGHT VERTEBRAL DIAS: 11.9 CM/S
RIGHT VERTEBRAL SYS: 39.3 CM/S
SP GR UR REFRACTOMETRY: 1.01 (ref 1–1.03)
TROPONIN I SERPL-MCNC: <0.05 NG/ML
TROPONIN I SERPL-MCNC: <0.05 NG/ML
TSH SERPL DL<=0.05 MIU/L-ACNC: 4.06 UIU/ML (ref 0.36–3.74)
UA: UC IF INDICATED,UAUC: ABNORMAL
UROBILINOGEN UR QL STRIP.AUTO: 0.2 EU/DL (ref 0.2–1)
VENTRICULAR RATE, ECG03: 81 BPM
VIT B12 SERPL-MCNC: 413 PG/ML (ref 193–986)
WBC URNS QL MICRO: ABNORMAL /HPF (ref 0–4)

## 2019-10-18 PROCEDURE — 97116 GAIT TRAINING THERAPY: CPT

## 2019-10-18 PROCEDURE — 94760 N-INVAS EAR/PLS OXIMETRY 1: CPT

## 2019-10-18 PROCEDURE — 93880 EXTRACRANIAL BILAT STUDY: CPT

## 2019-10-18 PROCEDURE — 74011250636 HC RX REV CODE- 250/636: Performed by: EMERGENCY MEDICINE

## 2019-10-18 PROCEDURE — 70551 MRI BRAIN STEM W/O DYE: CPT

## 2019-10-18 PROCEDURE — 92610 EVALUATE SWALLOWING FUNCTION: CPT

## 2019-10-18 PROCEDURE — 93306 TTE W/DOPPLER COMPLETE: CPT

## 2019-10-18 PROCEDURE — 96375 TX/PRO/DX INJ NEW DRUG ADDON: CPT

## 2019-10-18 PROCEDURE — 97535 SELF CARE MNGMENT TRAINING: CPT | Performed by: OCCUPATIONAL THERAPIST

## 2019-10-18 PROCEDURE — 74011250637 HC RX REV CODE- 250/637: Performed by: INTERNAL MEDICINE

## 2019-10-18 PROCEDURE — 97165 OT EVAL LOW COMPLEX 30 MIN: CPT | Performed by: OCCUPATIONAL THERAPIST

## 2019-10-18 PROCEDURE — 82306 VITAMIN D 25 HYDROXY: CPT

## 2019-10-18 PROCEDURE — 82784 ASSAY IGA/IGD/IGG/IGM EACH: CPT

## 2019-10-18 PROCEDURE — 97161 PT EVAL LOW COMPLEX 20 MIN: CPT

## 2019-10-18 PROCEDURE — 84484 ASSAY OF TROPONIN QUANT: CPT

## 2019-10-18 PROCEDURE — 74011250637 HC RX REV CODE- 250/637: Performed by: EMERGENCY MEDICINE

## 2019-10-18 PROCEDURE — 82607 VITAMIN B-12: CPT

## 2019-10-18 PROCEDURE — 74011000258 HC RX REV CODE- 258: Performed by: EMERGENCY MEDICINE

## 2019-10-18 PROCEDURE — 65660000000 HC RM CCU STEPDOWN

## 2019-10-18 PROCEDURE — 84443 ASSAY THYROID STIM HORMONE: CPT

## 2019-10-18 PROCEDURE — 36415 COLL VENOUS BLD VENIPUNCTURE: CPT

## 2019-10-18 PROCEDURE — 74011250636 HC RX REV CODE- 250/636: Performed by: INTERNAL MEDICINE

## 2019-10-18 PROCEDURE — 81001 URINALYSIS AUTO W/SCOPE: CPT

## 2019-10-18 RX ORDER — ONDANSETRON 2 MG/ML
4 INJECTION INTRAMUSCULAR; INTRAVENOUS
Status: DISCONTINUED | OUTPATIENT
Start: 2019-10-18 | End: 2019-10-19

## 2019-10-18 RX ORDER — FACIAL-BODY WIPES
10 EACH TOPICAL DAILY PRN
Status: DISCONTINUED | OUTPATIENT
Start: 2019-10-18 | End: 2019-10-21 | Stop reason: HOSPADM

## 2019-10-18 RX ORDER — ENOXAPARIN SODIUM 100 MG/ML
40 INJECTION SUBCUTANEOUS EVERY 24 HOURS
Status: DISCONTINUED | OUTPATIENT
Start: 2019-10-18 | End: 2019-10-21 | Stop reason: HOSPADM

## 2019-10-18 RX ORDER — ACETAMINOPHEN 325 MG/1
650 TABLET ORAL
Status: DISCONTINUED | OUTPATIENT
Start: 2019-10-18 | End: 2019-10-21 | Stop reason: HOSPADM

## 2019-10-18 RX ORDER — LISINOPRIL 20 MG/1
20 TABLET ORAL DAILY
Status: DISCONTINUED | OUTPATIENT
Start: 2019-10-18 | End: 2019-10-21 | Stop reason: HOSPADM

## 2019-10-18 RX ORDER — ASPIRIN 325 MG/1
100 TABLET, FILM COATED ORAL DAILY
Status: DISCONTINUED | OUTPATIENT
Start: 2019-10-18 | End: 2019-10-21 | Stop reason: HOSPADM

## 2019-10-18 RX ORDER — ATORVASTATIN CALCIUM 20 MG/1
20 TABLET, FILM COATED ORAL DAILY
Status: DISCONTINUED | OUTPATIENT
Start: 2019-10-18 | End: 2019-10-21 | Stop reason: HOSPADM

## 2019-10-18 RX ORDER — ACETAMINOPHEN 650 MG/1
650 SUPPOSITORY RECTAL
Status: DISCONTINUED | OUTPATIENT
Start: 2019-10-18 | End: 2019-10-21 | Stop reason: HOSPADM

## 2019-10-18 RX ORDER — LORAZEPAM 2 MG/ML
1 INJECTION INTRAMUSCULAR
Status: DISCONTINUED | OUTPATIENT
Start: 2019-10-18 | End: 2019-10-21 | Stop reason: HOSPADM

## 2019-10-18 RX ORDER — LANOLIN ALCOHOL/MO/W.PET/CERES
100 CREAM (GRAM) TOPICAL DAILY
Status: DISCONTINUED | OUTPATIENT
Start: 2019-10-18 | End: 2019-10-18

## 2019-10-18 RX ORDER — AMLODIPINE BESYLATE 5 MG/1
10 TABLET ORAL DAILY
Status: DISCONTINUED | OUTPATIENT
Start: 2019-10-18 | End: 2019-10-21 | Stop reason: HOSPADM

## 2019-10-18 RX ORDER — LORAZEPAM 1 MG/1
1 TABLET ORAL 3 TIMES DAILY
Status: DISCONTINUED | OUTPATIENT
Start: 2019-10-18 | End: 2019-10-19

## 2019-10-18 RX ORDER — THERA TABS 400 MCG
1 TAB ORAL DAILY
Status: DISCONTINUED | OUTPATIENT
Start: 2019-10-19 | End: 2019-10-21 | Stop reason: HOSPADM

## 2019-10-18 RX ORDER — FOLIC ACID 1 MG/1
1 TABLET ORAL DAILY
Status: DISCONTINUED | OUTPATIENT
Start: 2019-10-18 | End: 2019-10-21 | Stop reason: HOSPADM

## 2019-10-18 RX ORDER — GUAIFENESIN 100 MG/5ML
81 LIQUID (ML) ORAL DAILY
Status: DISCONTINUED | OUTPATIENT
Start: 2019-10-18 | End: 2019-10-21 | Stop reason: HOSPADM

## 2019-10-18 RX ADMIN — LORAZEPAM 1 MG: 1 TABLET ORAL at 17:30

## 2019-10-18 RX ADMIN — THIAMINE HYDROCHLORIDE 100 MG: 100 INJECTION, SOLUTION INTRAMUSCULAR; INTRAVENOUS at 00:18

## 2019-10-18 RX ADMIN — AMLODIPINE BESYLATE 10 MG: 5 TABLET ORAL at 13:59

## 2019-10-18 RX ADMIN — LORAZEPAM 1 MG: 1 TABLET ORAL at 09:10

## 2019-10-18 RX ADMIN — FOLIC ACID 1 MG: 1 TABLET ORAL at 09:10

## 2019-10-18 RX ADMIN — DIAZEPAM 5 MG: 5 TABLET ORAL at 00:03

## 2019-10-18 RX ADMIN — ASPIRIN 81 MG 81 MG: 81 TABLET ORAL at 09:10

## 2019-10-18 RX ADMIN — LISINOPRIL 20 MG: 20 TABLET ORAL at 13:59

## 2019-10-18 RX ADMIN — ENOXAPARIN SODIUM 40 MG: 40 INJECTION SUBCUTANEOUS at 09:10

## 2019-10-18 RX ADMIN — Medication 100 MG: at 09:10

## 2019-10-18 RX ADMIN — ATORVASTATIN CALCIUM 20 MG: 20 TABLET, FILM COATED ORAL at 13:59

## 2019-10-18 RX ADMIN — ACETAMINOPHEN 650 MG: 325 TABLET ORAL at 09:10

## 2019-10-18 RX ADMIN — LORAZEPAM 1 MG: 1 TABLET ORAL at 21:28

## 2019-10-18 NOTE — ED NOTES
Dr. Jeannie Vick has reviewed discharge instructions with the patient. The patient verbalized understanding. Patient dressed and waiting for ride home at this time.

## 2019-10-18 NOTE — DISCHARGE INSTRUCTIONS
Patient Education        Alcohol, Drug, or Poison Ingestion: Care Instructions  Your Care Instructions    A person can become very sick, or die, from swallowing or using alcohol, drugs, or poisons. Alcohol poisoning occurs when a person drinks a large amount of alcohol. Alcohol can stop nerve signals that control breathing. It can also stop the gag reflex that prevents choking. Alcohol poisoning is serious. It can lead to brain damage or death if it's not treated right away. Drugs can be used by accident or on purpose. They can be swallowed, inhaled, injected, or absorbed through the skin. Drugs include over-the-counter medicine (such as aspirin or acetaminophen) and prescription medicine. They also include vitamins and supplements. And they include illegal drugs such as cocaine and heroin. And poisons are all around us. They include household , cosmetics, houseplants, and garden chemicals. The doctor has checked you carefully, but problems can develop later. If you notice any problems or new symptoms, get medical treatment right away. Follow-up care is a key part of your treatment and safety. Be sure to make and go to all appointments, and call your doctor if you are having problems. It's also a good idea to know your test results and keep a list of the medicines you take. How can you care for yourself at home? Alcohol problems  · Talk to your doctor or counselor about programs that can help you stop using alcohol. · Plan ways to avoid being tempted to drink. ? Get rid of all alcohol in your home. ? Avoid places where you tend to drink. ? Stay away from places or events that offer alcohol. ? Stay away from people who drink a lot. Drug problems  · Talk to your doctor about programs that can help you stop using drugs. · Get rid of any drugs you might be tempted to misuse. · Learn how to say no when other people use drugs. · Don't spend time with people who use drugs.   Poison prevention  · Keep products in the containers they came in. Keep them with the original labels. · Be careful when you use cleaning products, paints, solvents, and pesticides. Read labels before use. Use a fan to move strong odors and fumes out of your home. · Do not mix cleaning products. Try to use nontoxic . These include vinegar, lemon juice, and baking soda. When should you call for help? Poison control centers, hospitals, or your doctor can give immediate advice in the case of a poisoning. The [employer] number is [phone]. Have the poison container with you so you can give complete information to the poison control center, such as what the poison or substance is, how much was taken and when. Do not try to make the person vomit.   Call 911 anytime you think you may need emergency care. For example, call if you or someone else:    · Has used or currently uses alcohol or drugs and is very confused or can't stay awake.     · Has passed out (lost consciousness).     · Has severe trouble breathing.     · Is having a seizure.    Call your doctor now or seek immediate medical care if you or someone else:    · Has new symptoms, or is not acting normally.    Watch closely for changes in your health, and be sure to contact your doctor if:    · You do not get better as expected.     · You need help with drug or alcohol problems.     · You have problems with depression or other mental health issues. Where can you learn more? Go to http://tawanna-jeromy.info/. Enter O748 in the search box to learn more about \"Alcohol, Drug, or Poison Ingestion: Care Instructions. \"  Current as of: June 26, 2019  Content Version: 12.2  © 1539-9819 [employer]. Care instructions adapted under license by [facility] (which disclaims liability or warranty for this information).  If you have questions about a medical condition or this instruction, always ask your healthcare professional. [address] any warranty or liability for your use of this information.

## 2019-10-18 NOTE — PROGRESS NOTES
Hospitalist Progress Note    NAME: Barbra Mancuso   :  1945   MRN:  [id number]       Assessment / Plan:    74years old male with a past medical history of heavy alcohol abuse, hypertension, hyperlipidemia who was transferred from [facility] for evaluation of tremors,  gait abnormality and chest pain. 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 Doppler of carotids unremarkable. B12, Vit D WNL. Elevated TSH. Pending immunofixation results. Ft4, RPR, lipid panel ordered. Discussed case with Neurology. On ASA and statin. PT OT recommended  PT/OT.  [facility] before discharge to home. Alcohol use disorder  Alcohol withdrawal  Patient is willing to quit drinking. Start oral lorazepam 1 mg tid and plan to discharge home on tapering of po lorazepam.   Continue thiamine and MVM. Atypical Chest pain- resolved  Negative serial troponin   Echocardiogram LVEF 51-55%, borderline low normal RV systolic dysfunction. No signs of CHF. Patient asymptomatic and hemodynamically stable. F/u echocardiogram in 6-12 months. Hypertension  Controlled. Continue home amlodipine and lisinopril     Hyperlipidemia  Lipid panel ordered. On home Zocor        25.0 - 29.9 Overweight / Body mass index is 27.04 kg/m². Code status: Full  Prophylaxis: SCD's  Recommended Disposition:  PT, OT, RN     Subjective:     Chief Complaint / Reason for Physician Visit  CC: tremors, unsteady gait. Patient has no new complaints. He is agreeable to quit drinking alcohol. No nausea. No vomiting. Discussed with RN events overnight. Review of Systems:  Except as documented all systems reviewed and negative. Objective:     VITALS:   Last 24hrs VS reviewed since prior progress note.  Most recent are:  Patient Vitals for the past 24 hrs: Temp Pulse Resp BP SpO2   10/18/19 1726 97.6 °F (36.4 °C) 87 18 132/68 98 %   10/18/19 1248 97.6 °F (36.4 °C) 79 18 132/73 99 %   10/18/19 0755 97.9 °F (36.6 °C) 87 18 135/75 97 %   10/18/19 0400 98.4 °F (36.9 °C) 77 16 137/58 96 %   10/18/19 0130  79 15 125/67 94 %   10/18/19 0030  83 17 121/58 94 %   10/18/19 0027  86 18  97 %   10/18/19 0026    138/53    10/17/19 2130  88 15 146/71    10/17/19 2119     95 %   10/17/19 2045  80 11 143/74 97 %   10/17/19 1945  81 15 137/73 94 %   10/17/19 1930  78 18 144/61 95 %   10/17/19 1915  79 21 129/63 93 %   10/17/19 1909     97 %   10/17/19 1845  78 13 139/62 96 %   10/17/19 1800 97.8 °F (36.6 °C) 89 16 138/75 97 %       Intake/Output Summary (Last 24 hours) at 10/18/2019 1745  Last data filed at 10/18/2019 0910  Gross per 24 hour   Intake 640 ml   Output    Net 640 ml        PHYSICAL EXAM:  General: Alert, cooperative, no acute pain or distress    EENT:  Anicteric sclerae. Resp:  CTA bilaterally, no wheezing or rales. No accessory muscle use  CV:  Regular  rate,  No LE edema  GI:  Soft, Non distended, Non tender.  +Bowel sounds  Neurologic:  Alert and oriented X 3, normal speech. Psych:   Not anxious nor agitated  EXt:   No cyanosis. Skin:  No jaundice    Reviewed most current lab test results and cultures  YES  Reviewed most current radiology test results   YES  Review and summation of old records today    NO  Reviewed patient's current orders and MAR    YES  PMH/SH reviewed - no change compared to H&P  ____________________________________________________________________________________________________________    Monroe MD Leonela     Procedures: see electronic medical records for all procedures/Xrays and details which were not copied into this note but were reviewed prior to creation of Plan. LABS:  I reviewed today's most current labs and imaging studies.   Pertinent labs include:  Recent Labs     10/17/19  0670 10/17/19  1055   WBC 5.1 8.4   HGB 14.9 15.3   HCT 44.7 46.8    167     Recent Labs     10/17/19  1907 10/17/19  1055    138   K 3.5 5.5*    101   CO2 26 27   GLU 88 105*   BUN 9 12   CREA 0.85 0.93   CA 8.7 9.4   MG 1.8 1.7   ALB 3.5 3.8   TBILI 0.8 0.6   SGOT 45* 59*   ALT 47 49       Signed: Marcelino Markham MD

## 2019-10-18 NOTE — PROGRESS NOTES
Speech path/swallowing evaluation  Pt was admitted with gait abnormality. He had a head CT that was negative. Neurology has seen the pt and noted no communication deficits. S pleasant and cooperative. O; no oral motor weakness noted. Speech was fully intelligible. No language deficits in conversation. He was evaluated with purees, thins and solids. No oropharyngeal dysphagia was noted. Tolerated all with no coughing. A; no language, motor speech or swallowing deficits. P: no follow up needed.    Liz Miranda MA, CCC-SLP

## 2019-10-18 NOTE — PROGRESS NOTES
Problem: Self Care Deficits Care Plan (Adult)  Goal: *Acute Goals and Plan of Care (Insert Text)  Description      FUNCTIONAL STATUS PRIOR TO ADMISSION: Patient was independent and active without use of DME. H/o ETOH abuse    HOME SUPPORT: The patient lived alone with no local support. Occupational Therapy Goals  Initiated 10/18/2019  1. Patient will perform grooming standing at sink with independence within 7 day(s). 2.  Patient will perform upper body dressing with independence within 7 day(s). 3.  Patient will perform lower body dressing with independence within 7 day(s). 4.  Patient will perform toilet transfers with independence within 7 day(s). 5.  Patient will perform all aspects of toileting with independence within 7 day(s). 6.  Patient will perform ADL setup with independence within 7 day(s). Outcome: Progressing Towards Goal    OCCUPATIONAL THERAPY EVALUATION  Patient: Ewa Maloney (57 y.o. male)  Date: 10/18/2019  Primary Diagnosis: Gait abnormality [R26.9]        Precautions: Falls       ASSESSMENT  Based on the objective data described below, the patient presents grossly tremulous with decreased balance, activity tolerance and mild GW which is impeding his functional independence. He has a h/o ETOH abuse and reports having had worsening tremors over the past 2 weeks prior to admit. Although he is functioning below his independent baseline he will likely progress with acute OT and PT intervention and should be appropriate for discharge to home with [facility] therapies when medically stable. Current Level of Function Impacting Discharge (ADLs/self-care): Patient is independent to min A for ADLs and is SBA to CGA for functional mobility. Functional Outcome Measure: The patient scored 65/100 on the Barthel Index outcome measure which is indicative of a 35% impairment in function. Patient will benefit from skilled therapy intervention to address the above noted impairments. PLAN :  Recommendations and Planned Interventions: self care training, functional mobility training, balance training, therapeutic activities, endurance activities, neuromuscular re-education, patient education and home safety training    Frequency/Duration: Patient will be followed by occupational therapy 3 times a week to address goals. Recommendation for discharge: (in order for the patient to meet his/her long term goals)  Occupational therapy at least 2 days/week in the home     This discharge recommendation:  Has been made in collaboration with the attending provider and/or case management    Equipment recommendations for successful discharge (if) home: None anticipated. OBJECTIVE DATA SUMMARY:   HISTORY:   Past Medical History:   Diagnosis Date    Gout     Hypercholesterolemia     Hypertension     Hypokalemia      Past Surgical History:   Procedure Laterality Date    COLONOSCOPY N/A 4/23/2018    COLONOSCOPY performed by Zara Taylor MD at [facility], COLON, DIAGNOSTIC      HX COLONOSCOPY  2011    HX COLONOSCOPY  2018    polyps    HX CYST REMOVAL      rt foot x2    HX HEENT      bilat cataract repair    HX ORTHOPAEDIC      left ankle fx  rt ankle fx    HX ORTHOPAEDIC      L hip repair       Expanded or extensive additional review of patient history:     Home Situation  Home Environment: Private residence  # Steps to Enter: 4  Rails to Enter: Yes  One/Two Story Residence: Two story(stays on the first floor, 40 steps to embankment)  # of [employer]: 21  Interior Rails: Left  Current DME Used/Available at Home: Cane, straight, Walker, rolling  Tub or Shower Type: Shower(pt uses garden tub at baseline)    Hand dominance: Right    EXAMINATION OF PERFORMANCE DEFICITS:  Cognitive/Behavioral Status:  Neurologic State: Alert  Orientation Level: Oriented X4  Cognition: Follows commands; Impaired decision making        Safety/Judgement: Awareness of environment; Insight into deficits      Hearing: Auditory  Auditory Impairment: None    Vision/Perceptual:    Acuity: Within Defined Limits         Range of Motion:  AROM: Within functional limits  PROM: Within functional limits                      Strength:  Strength: Within functional limits(slight L proximal hip weakness, but functional)                Coordination:  Coordination: Within functional limits(Grossly tremulous)            Tone & Sensation:  Tone: Normal  Sensation: Impaired(L lower leg numbness above the ankle)                      Balance:  Sitting: Intact  Standing: Impaired  Standing - Static: Good  Standing - Dynamic : Fair    Functional Mobility and Transfers for ADLs:  Bed Mobility:   Presented seated EOB    Transfers:  Sit to Stand: Stand-by assistance  Stand to Sit: Stand-by assistance  Bed to Chair: Contact guard assistance  Bathroom Mobility: Contact guard assistance    ADL Assessment:  Feeding: Independent    Oral Facial Hygiene/Grooming: Stand-by assistance    Bathing: Contact guard assistance    Upper Body Dressing: Stand-by assistance    Lower Body Dressing: Minimum assistance    Toileting: Stand by assistance                ADL Intervention and task modifications:  Initiated bed mobility, dressing ADL, transfer, toileting, standing grooming and safety training. Functional Measure:  Barthel Index:    Bathin  Bladder: 10  Bowels: 10  Groomin  Dressin  Feeding: 10  Mobility: 10  Stairs: 5  Toilet Use: 5  Transfer (Bed to Chair and Back): 10  Total: 65/100        Percentage of impairment   0%   1-19%   20-39%   40-59%   60-79%   80-99%   100%   Barthel Score 0-100 100 99-80 79-60 59-40 20-39 1-19   0     The Barthel ADL Index: Guidelines  1. The index should be used as a record of what a patient does, not as a record of what a patient could do. 2. The main aim is to establish degree of independence from any help, physical or verbal, however minor and for whatever reason.   3. The need for supervision renders the patient not independent. 4. A patient's performance should be established using the best available evidence. Asking the patient, friends/relatives and nurses are the usual sources, but direct observation and common sense are also important. However direct testing is not needed. 5. Usually the patient's performance over the preceding 24-48 hours is important, but occasionally longer periods will be relevant. 6. Middle categories imply that the patient supplies over 50 per cent of the effort. 7. Use of aids to be independent is allowed. Viviana Cronin., Barthel, D.W. (2281). Functional evaluation: the Barthel Index. [facility] (14)2. Alcira Rowe johnna GE Diego, Lul Vidal., Mallory Mejia., Albin, [address] (1999). Measuring the change indisability after inpatient rehabilitation; comparison of the responsiveness of the Barthel Index and Functional Butts Measure. Journal of Neurology, Neurosurgery, and Psychiatry, 66(4), 050-498. Andrew Mireles, N.J.A, JULIANE Colón, & Davis Little M.A. (2004.) Assessment of post-stroke quality of life in cost-effectiveness studies: The usefulness of the Barthel Index and the EuroQoL-5D. Quality of Life Research, 13, 138-40     Activity Tolerance:   Good  Please refer to the flowsheet for vital signs taken during this treatment. After treatment patient left in no apparent distress:    Sitting in chair and Call bell within reach    COMMUNICATION/EDUCATION:   The patients plan of care was discussed with: Physical Therapist and Registered Nurse. Home safety education was provided and the patient/caregiver indicated understanding., Patient/family have participated as able in goal setting and plan of care. and Patient/family agree to work toward stated goals and plan of care. This patients plan of care is appropriate for delegation to [facility].     Thank you for this referral.  Steven Orr, OTR/L  Time Calculation: 28 mins

## 2019-10-18 NOTE — PROGRESS NOTES
Patient evaluated for PT needs and full not to follow. Patient will benefit from acute PT 3 x per week and will benefit from [facility] and PT after discharge.

## 2019-10-18 NOTE — CONSULTS
Neurology Note    Patient ID:  Mani Norton  [id number]  74 y.o.  1945      Date of Consultation:  October 18, 2019    Referring Physician: Dr. Daily Guzman    Reason for Consultation:  stroke    Subjective: I had trouble with walking. History of Present Illness:   Mani Norton is a 74 y.o. male with a significant past history of hypertension, hyperlipidemia and notable alcohol use in the past who was transferred from [facility] for an evaluation of gait abnormalities and worsening tremor. He also did have chest pain upon his arrival to that hospital.  Today, he does state he has had a long-standing history of alcohol use but has tried to cut down more recently. He did state however that just 2 days ago he did have a few mixed drinks while watching the [place] play. He typically has 2-3 mixed drinks a day. He has been trying to cut down recently. He does report that he has a tremor that has been worse recently. He notices this more in his legs but he also notices this in his arms as well. It is worse with action and better when he is sitting still. He has noticed that his balance is also gotten notably worse over the past few days to week. He denies any focal numbness, tingling or weakness.       Past Medical History:   Diagnosis Date    Gout     Hypercholesterolemia     Hypertension     Hypokalemia         Past Surgical History:   Procedure Laterality Date    COLONOSCOPY N/A 4/23/2018    [facility], COLON, DIAGNOSTIC      HX COLONOSCOPY  2011    HX COLONOSCOPY  2018    polyps    HX CYST REMOVAL      rt foot x2    HX HEENT      bilat cataract repair    HX ORTHOPAEDIC      left ankle fx  rt ankle fx    HX ORTHOPAEDIC      L hip repair        Family History   Problem Relation Age of Onset    Diabetes Mother     Dementia Mother     Heart Failure Father     Obesity Brother     Hypertension Brother         Social History     Tobacco Use    Smoking status: Never Smoker    Smokeless tobacco: Never Used   Substance Use Topics    Alcohol use: Yes     Alcohol/week: 12.5 - 15.0 standard drinks     Types: 15 - 18 Standard drinks or equivalent per week     Comment: mariely        No Known Allergies     Prior to Admission medications    Medication Sig Start Date End Date Taking? Authorizing Provider   amLODIPine (NORVASC) 10 mg tablet TAKE ONE TABLET BY MOUTH DAILY FOR BLOOD PRESSURE 9/17/19   Good, George Alexander R, DO   simvastatin (ZOCOR) 20 mg tablet TAKE ONE TABLET BY MOUTH NIGHTLY FOR CHOLESTEROL AND HEART 5/3/19   Good, Lotus R, DO   lisinopril (PRINIVIL, ZESTRIL) 20 mg tablet TAKE ONE TABLET BY MOUTH DAILY. Indications: pressure 3/22/19   Fernando, Lotus ALCANTARA, DO   VIT C/E/ZN/COPPR/LUTEIN/ZEAXAN (PRESERVISION AREDS 2 PO) Take 1 Tab by mouth two (2) times a day.     Provider, Historical       Review of Systems:    General, constitutional: Generalized weakness and balance difficulty  Eyes, vision: negative  Ears, nose, throat: negative  Cardiovascular, heart: Intermittent chest pain  Respiratory: negative  Gastrointestinal: negative  Genitourinary: negative  Musculoskeletal: Left hip and ankle pain  Skin and integumentary: negative  Psychiatric: negative  Endocrine: negative  Neurological: negative, except for HPI  Hematologic/lymphatic: negative  Allergy/immunology: negative      Objective:     Visit Vitals  /58 (BP Patient Position: Sitting)   Pulse 77   Temp 98.4 °F (36.9 °C)   Resp 16   Ht 5' 6\" (1.676 m)   Wt 167 lb 8.8 oz (76 kg)   SpO2 96%   BMI 27.04 kg/m²       Physical Exam:      General:  appears well nourished in no acute distress  Neck: no carotid bruits  Lungs: clear to auscultation  Heart:  no murmurs, regular rate  Lower extremity: peripheral pulses palpable and no edema  Skin: intact    Neurological exam:    Awake, alert, oriented to person, place and time  Recent and remote memory were normal  Attention and concentration were intact  Language was intact. There was no aphasia  Speech: no dysarthria  Fund of knowledge was preserved    Cranial nerves:   II-XII were tested    Perrrla  Fundoscopic examination revealed venous pulsations and no clear abnormalities  Visual fields were full  Eomi, no evidence of nystagmus  Facial sensation:  normal and symmetric  Facial motor: normal and symmetric  Hearing intact  SCM strength intact  Tongue: midline without fasciculations    Motor: Tone normal.  There was no cogwheel rigidity    No evidence of fasciculations    Strength testing:   deltoid triceps biceps Wrist ext. Wrist flex. intrinsics Hip flex. Hip ext. Knee ext. Knee flex Dorsi flex Plantar flex   Right 5 5 5 5 5 4 5 5 5 5 5 5   Left 5 5 5 5 5 4 5 5 5 5 5 5         Sensory:  Upper extremity: intact to pp, light touch, and vibration > 10 seconds  Lower extremity: Pinprick was decreased to just above his knees. Vibration was absent at his toes and at his ankles. It was present for 5 seconds at his knees. Reflexes:    Right Left  Biceps  2 2  Triceps 2 2  Brachiorad. 2 2  Patella             - -  Achilles - -    Plantar response:  flexor bilaterally      Cerebellar testing: There was no resting tremor that was apparent. He does have a clear action based tremor in his bilateral upper extremities. There is only a mild postural component to this. It is slightly worse on the left than the right. This does impact his finger tapping. Romberg: Present. He is quite tremulous upon standing    Gait: Unsteady. He does need assistance and has a wide-based gait.     Labs:     Lab Results   Component Value Date/Time    Hemoglobin A1c 5.5 10/01/2019 11:18 AM    Sodium 138 10/17/2019 07:07 PM    Potassium 3.5 10/17/2019 07:07 PM    Chloride 103 10/17/2019 07:07 PM    Glucose 88 10/17/2019 07:07 PM    BUN 9 10/17/2019 07:07 PM    Creatinine 0.85 10/17/2019 07:07 PM    Calcium 8.7 10/17/2019 07:07 PM WBC 5.1 10/17/2019 07:07 PM    HCT 44.7 10/17/2019 07:07 PM    HGB 14.9 10/17/2019 07:07 PM    PLATELET 441 48/59/5650 07:07 PM       Imaging:    No results found for this or any previous visit. Results from [place] encounter on 10/17/19   CT HEAD WO CONT    Narrative EXAM: CT HEAD WO CONT    INDICATION: unsteady gait    COMPARISON: None. CONTRAST: None. TECHNIQUE: Unenhanced CT of the head was performed using 5 mm images. Brain and  bone windows were generated. CT dose reduction was achieved through use of a  standardized protocol tailored for this examination and automatic exposure  control for dose modulation. FINDINGS:  The ventricles and sulci are normal in size, shape and configuration and  midline. There is no significant white matter disease. There is no intracranial  hemorrhage, extra-axial collection, mass, mass effect or midline shift. The  basilar cisterns are open. No acute infarct is identified. The bone windows  demonstrate no abnormalities. The visualized portions of the paranasal sinuses  and mastoid air cells are clear. Severe bilateral vertebral artery  calcification. Impression IMPRESSION:    No acute intracranial process seen. Severe bilateral vertebral artery calcification can be associated with  vertebrobasilar insufficiency syndrome. I did independently review the head CT performed yesterday. There was evidence of some mild atrophy. The calcifications were noted in the bilateral vertebral arteries. I did review the final interpretation provided by radiology. Preliminary results of the carotid Doppler show a right internal carotid artery with less than 50% and a moderate stenosis of the left internal carotid artery at 50 to 69%. Final report is still pending. He did have a hemoglobin A1c checked on October 1, 2019 which was 5.5  Assessment and Plan:    This is a pleasant 58-year-old gentleman who has developed progressive balance difficulties and tremors. His neurological examination is notable for a rather severe distal length dependent sensorimotor neuropathy as well as an action based tremor which has the appearance of a cerebellar component to it. Severe peripheral neuropathy:    His heavy alcohol use is most likely a contributing factor. I would recommend checking a vitamin B12, a serum immunofixation, a TSH level, a RPR. He will most likely need an EMG nerve conduction study as an outpatient. Tremor which is predominantly action based and slightly asymmetric:  I would recommend an MRI of his brain. He does have significant vertebral calcification and I am concerned about injury to the brainstem and cerebellum. This tremor may also be part of an alcohol withdrawal symptom and needs to be monitored closely. If medication is necessary, I would recommend considering primidone twice a day. Risk factors for stroke:  He does have multiple risk factors for stroke including hypertension and high cholesterol:  Continue aspirin daily. Continue aggressive blood pressure control. Please maintain systolic blood pressure under 140  Continue statin therapy  Will need to follow-up on final results of carotid Doppler studies. Pending those results, he may need a vascular surgery consult.     Heavy alcohol use:  He is at risk for withdrawal.    Continue thiamine and multivitamin   Continue monitoring for signs and symptoms of withdrawal                 Signed By:  Kennedy Basurto DO FAAN    October 18, 2019

## 2019-10-18 NOTE — ED NOTES
TRANSFER - OUT REPORT:    Verbal report given to Chrissy RN (name) on Moon Bourne  being transferred to Neuro/Observation, Rm 3119 (unit) for routine progression of care       Report consisted of patients Situation, Background, Assessment and   Recommendations(SBAR). Information from the following report(s) SBAR, Kardex, ED Summary, Intake/Output, MAR and Recent Results was reviewed with the receiving nurse. Lines:   Peripheral IV 10/17/19 Left Forearm (Active)   Site Assessment Clean, dry, & intact 10/17/2019 11:59 PM   Phlebitis Assessment 0 10/17/2019 11:59 PM   Infiltration Assessment 0 10/17/2019 11:59 PM   Dressing Status Clean, dry, & intact 10/17/2019 11:59 PM   Dressing Type Tape;Transparent 10/17/2019 11:59 PM   Hub Color/Line Status Pink;Patent; Flushed 10/17/2019 11:59 PM        Opportunity for questions and clarification was provided.       Patient transported with:   MD.Voice

## 2019-10-18 NOTE — PROGRESS NOTES
Problem: Falls - Risk of  Goal: *Absence of Falls  Description  Document Tank Sanders Fall Risk and appropriate interventions in the flowsheet.   Outcome: Progressing Towards Goal  Note:   Fall Risk Interventions:  Mobility Interventions: Bed/chair exit alarm, Communicate number of staff needed for ambulation/transfer, Mechanical lift, Patient to call before getting OOB, OT consult for ADLs, PT Consult for mobility concerns, PT Consult for assist device competence         Medication Interventions: Bed/chair exit alarm, Evaluate medications/consider consulting pharmacy, Patient to call before getting OOB, Teach patient to arise slowly, Utilize gait belt for transfers/ambulation         History of Falls Interventions: Bed/chair exit alarm, Consult care management for discharge planning, Door open when patient unattended, Evaluate medications/consider consulting pharmacy, Investigate reason for fall, Room close to nurse's station, Utilize gait belt for transfer/ambulation

## 2019-10-18 NOTE — PROGRESS NOTES
Patient evaluated for OT needs and full not to follow. Patient will benefit from acute OT 3 x per week and will benefit from [facility] and PT after discharge.

## 2019-10-18 NOTE — ROUTINE PROCESS
MRI NOTE 
 
MRI screening sheet needs to be completed and signed before the MRI can be performed. Please call [phone]  When this is done

## 2019-10-18 NOTE — PROGRESS NOTES
Problem: Mobility Impaired (Adult and Pediatric)  Goal: *Acute Goals and Plan of Care (Insert Text)  Description  FUNCTIONAL STATUS PRIOR TO ADMISSION: Patient was independent and active without use of DME.    HOME SUPPORT PRIOR TO ADMISSION: The patient lived alone with no local support. Physical Therapy Goals  Initiated 10/18/2019  1. Patient will move from supine to sit and sit to supine , scoot up and down and roll side to side in bed with modified independence within 7 day(s). 2.  Patient will transfer from bed to chair and chair to bed with modified independence using the least restrictive device within 7 day(s). 3.  Patient will perform sit to stand with modified independence within 7 day(s). 4.  Patient will ambulate with independence for 200 feet with the least restrictive device within 7 day(s). 5.  Patient will ascend/descend 20 stairs with L handrail(s) with modified independence within 7 day(s). 10/18/2019 1034 by Stephy Zamora PT  Outcome: Progressing Towards Goal  PHYSICAL THERAPY EVALUATION  Patient: Capo Carmona (56 y.o. male)  Date: 10/18/2019  Primary Diagnosis: Gait abnormality [R26.9]        Precautions: Fall         ASSESSMENT  Based on the objective data described below, the patient presents with impaired gait,  slight L hip proximal muscle weakness, decreased dynamic standing balance, and decreased activity tolerance. Pt received sitting eob, agreeable to PT evaluation. Noted pt is grossly tremulous and pt reports the tremors have been slowly getting progressively worse. Noted in chart that pt consumes a moderate amount of alcohol daily. At baseline pt lives alone and is I. Pt demonstrates all mobility at cga to sba. Pt able to navigate steps with rail, but had one lob. Pt given instruction how to navigate steps with use of B rail and step to pattern with ascending stairs, for increased stability. Pt with good return demo.  Pt's new deficits are most likely his new normal and pt would benefit from HHPT to educate pt on compensatory techniques and safe mobility at his new baseline. Current Level of Function Impacting Discharge (mobility/balance): CGA to SBA    Functional Outcome Measure: The patient scored 65/100 on the Barthel Index outcome measure which is indicative of 35% impaired function and adls      Other factors to consider for discharge: lives alone, moderate consumption of alcohol     Patient will benefit from skilled therapy intervention to address the above noted impairments. PLAN :  Recommendations and Planned Interventions: transfer training, gait training, patient and family training/education and therapeutic activities      Frequency/Duration: Patient will be followed by physical therapy:  3 times a week to address goals. Recommendation for discharge: (in order for the patient to meet his/her long term goals)  Physical therapy at least 2 days/week in the home     This discharge recommendation:  Has been made in collaboration with the attending provider and/or case management    Equipment recommendations for successful discharge (if) home: none         SUBJECTIVE:   Patient stated I am being more cautious with my walking.     OBJECTIVE DATA SUMMARY:   HISTORY:    Past Medical History:   Diagnosis Date    Gout     Hypercholesterolemia     Hypertension     Hypokalemia      Past Surgical History:   Procedure Laterality Date    COLONOSCOPY N/A 4/23/2018    COLONOSCOPY performed by Vanna Milan MD at [address] [place] [address], COLON, DIAGNOSTIC      HX COLONOSCOPY  2011    HX COLONOSCOPY  2018    polyps    HX CYST REMOVAL      rt foot x2    HX HEENT      bilat cataract repair    HX ORTHOPAEDIC      left ankle fx  rt ankle fx    HX ORTHOPAEDIC      L hip repair       Personal factors and/or comorbidities impacting plan of care:  none    Home Situation  Home Environment: Private residence  # Steps to Enter: 4  Rails to Enter: Yes  One/Two Story Residence: Two story(stays on the first floor, 40 steps to embankment)  # of Interior Steps: 20  Interior Rails: Left  Current DME Used/Available at Home: [employer] Josefa, straight, Walker, rolling  Tub or Shower Type: Shower(pt uses garden tub at baseline)    EXAMINATION/PRESENTATION/DECISION MAKING:   Critical Behavior:  Neurologic State: Alert  Orientation Level: Oriented X4  Cognition: Follows commands, Impaired decision making  Safety/Judgement: Awareness of environment, Insight into deficits  Hearing: Auditory  Auditory Impairment: None  Range Of Motion:  AROM: Within functional limits           PROM: Within functional limits           Strength:    Strength: Within functional limits(slight L proximal hip weakness, but functional)                    Tone & Sensation:   Tone: Normal              Sensation: Impaired(L lower leg numbness above the ankle)               Coordination:  Coordination: Within functional limits(Grossly tremulous)  Vision:   Acuity: Within Defined Limits  Functional Mobility:  Bed Mobility:  Deferred, pt seated eob           Transfers:  Sit to Stand: Stand-by assistance  Stand to Sit: Stand-by assistance        Bed to Chair: Contact guard assistance              Balance:   Sitting: Intact  Standing: Impaired  Standing - Static: Good  Standing - Dynamic : Fair  Ambulation/Gait Training:  Distance (ft): 150 Feet (ft)  Assistive Device: Gait belt  Ambulation - Level of Assistance: Stand-by assistance;Contact guard assistance     Gait Description (WDL): Exceptions to WDL  Gait Abnormalities: Decreased step clearance        Base of Support: Widened  Stance: Left decreased  Speed/Faith: Pace decreased (<100 feet/min); Shuffled  Step Length: Right shortened;Left shortened    Stairs:  Number of Stairs Trained: 11  Stairs - Level of Assistance: Contact guard assistance   Rail Use: Left     Functional Measure:  Barthel Index:    Bathin  Bladder: 10  Bowels: 10  Groomin  Dressin  Feeding: 10  Mobility: 10  Stairs: 5  Toilet Use: 5  Transfer (Bed to Chair and Back): 10  Total: 65/100       The Barthel ADL Index: Guidelines  1. The index should be used as a record of what a patient does, not as a record of what a patient could do. 2. The main aim is to establish degree of independence from any help, physical or verbal, however minor and for whatever reason. 3. The need for supervision renders the patient not independent. 4. A patient's performance should be established using the best available evidence. Asking the patient, friends/relatives and nurses are the usual sources, but direct observation and common sense are also important. However direct testing is not needed. 5. Usually the patient's performance over the preceding 24-48 hours is important, but occasionally longer periods will be relevant. 6. Middle categories imply that the patient supplies over 50 per cent of the effort. 7. Use of aids to be independent is allowed. Blanca King., Barthel, DDagobertoW. (1938). Functional evaluation: the Barthel Index. [facility] (14)2. GE Ford, Diamante Fung., Sammuel Hatchet.[facility], [address] [place] [address] (1999). Measuring the change indisability after inpatient rehabilitation; comparison of the responsiveness of the Barthel Index and Functional [place] Measure. Journal of Neurology, Neurosurgery, and Psychiatry, 66(4), 540-582. Yanet Siddiqi, [place]WILLIAM, JULIANE Colón, & Astrid Stewart MDaogbertoA. (2004.) Assessment of post-stroke quality of life in cost-effectiveness studies: The usefulness of the Barthel Index and the EuroQoL-5D.  Quality of Life Research, 15, 823-43           Physical Therapy Evaluation Charge Determination   History Examination Presentation Decision-Making   MEDIUM  Complexity : 1-2 comorbidities / personal factors will impact the outcome/ POC  LOW Complexity : 1-2 Standardized tests and measures addressing body structure, function, activity limitation and / or participation in recreation  LOW Complexity : Stable, uncomplicated  LOW Complexity : FOTO score of       Based on the above components, the patient evaluation is determined to be of the following complexity level: LOW     Pain Rating:  N/a    Activity Tolerance:   Fair to Good  Please refer to the flowsheet for vital signs taken during this treatment. After treatment patient left in no apparent distress:   Sitting in chair and Call bell within reach    COMMUNICATION/EDUCATION:   The patients plan of care was discussed with: Occupational Therapist, Registered Nurse and Physician. Fall prevention education was provided and the patient/caregiver indicated understanding., Patient/family have participated as able in goal setting and plan of care. and Patient/family agree to work toward stated goals and plan of care.     Thank you for this referral.  Caridad Pandey, PT   Time Calculation: 28 mins

## 2019-10-18 NOTE — ED PROVIDER NOTES
EMERGENCY DEPARTMENT HISTORY AND PHYSICAL EXAM      Date: 10/17/2019  Patient Name: Delia Watts  Patient Age and Sex: 74 y.o. male    History of Presenting Illness     Chief Complaint   Patient presents with    Alcohol Problem     Pt transferred from [facility] for alcohol withdrawal. Last drink yesterday afternoon. No N/V but very tremorous       History Provided By: Patient    HPI: Delia Watts, is a 74 y.o. male sent to us from [facility] for further treatment of alcohol withdrawal. He initially presented to [facility] after visiting his primary care doctor where he was evaluated for intermittent chest pain, shortness of breath and worsening tremor in his hands as well as more unsteady gait. According to the patient and his medical records, the patient has had a long-standing history of daily alcohol use. He stopped drinking abruptly yesterday. His tremor and unsteady gait began to get worse last night. A review of OSH and PMD's visit records, his tremor and gait were attributed to alcohol withdrawal. The patient was given two doses of IV ativan prior to being transferred to us. The patient states that he drinks 2-3 alcoholic drinks (usually martinis) per day. He drinks on most days of the week and this has been going on for many years. Although he cannot recall any long periods of abstinence, he has never bee hospitalized in the setting of alcohol withdrawal. He does note that his tremors are usually worse when he does not have a drink. His unsteady gait has been getting worse for a few weeks but he states that it got particularly bad during the pst 3-4 days. This began prior to his cessation of drinking. No history of seizures or DTs. No history of recent falls. Denies any recent illnesses. He reported having intermittent dull chest pain and exertional sob. These episodes are usually triggered by exertion and have been occurring more frequently. Currently denies any cardiac or respiratory symptoms.    Vital signs on arrival to our ED are normal. Patient appears slightly disheveled but is alert and oriented x 4, cooperative, has no complaints. He also specifically denies any significant depression or SI. Pt denies any other alleviating or exacerbating factors. There are no other complaints, changes or physical findings at this time. Mr. Benjamin Zayas lives alone. His family lives out of state. Normally he is able to care for himself and ambulate without assistance. Past Medical History:   Diagnosis Date    Gout     Hypercholesterolemia     Hypertension     Hypokalemia      Past Surgical History:   Procedure Laterality Date    COLONOSCOPY N/A 4/23/2018    COLONOSCOPY performed by Darwin Wild MD at [address], COLON, DIAGNOSTIC      HX COLONOSCOPY  2011    HX COLONOSCOPY  2018    polyps    HX CYST REMOVAL      rt foot x2    HX HEENT      bilat cataract repair    HX ORTHOPAEDIC      left ankle fx  rt ankle fx    HX ORTHOPAEDIC      L hip repair       PCP: Hortensia Argueta DO    Past History   Past Medical History:  Past Medical History:   Diagnosis Date    Gout     Hypercholesterolemia     Hypertension     Hypokalemia        Past Surgical History:  Past Surgical History:   Procedure Laterality Date    COLONOSCOPY N/A 4/23/2018    COLONOSCOPY performed by Darwin Wild MD at [address], COLON, DIAGNOSTIC      HX COLONOSCOPY  2011    HX COLONOSCOPY  2018    polyps    HX CYST REMOVAL      rt foot x2    HX HEENT      bilat cataract repair    HX ORTHOPAEDIC      left ankle fx  rt ankle fx    HX ORTHOPAEDIC      L hip repair       Family History:  Family History   Problem Relation Age of Onset    Diabetes Mother     Dementia Mother     Heart Failure Father     Obesity Brother     Hypertension Brother        Social History:  Social History     Tobacco Use    Smoking status: Never Smoker    Smokeless tobacco: Never Used   Substance Use Topics    Alcohol use:  Yes Alcohol/week: 12.5 - 15.0 standard drinks     Types: 15 - 18 Standard drinks or equivalent per week     Comment: mariely    Drug use: No       Allergies:  No Known Allergies    Current Medications:  Current Facility-Administered Medications on File Prior to Encounter   Medication Dose Route Frequency Provider Last Rate Last Dose    [COMPLETED] LORazepam (ATIVAN) injection 2 mg  2 mg IntraVENous ONCE Hanna Schafer MD   2 mg at 10/17/19 1341    [COMPLETED] 0.9% sodium chloride 1,000 mL with mvi, adult no. 4 with vit K 10 mL, thiamine 429 mg, folic acid 1 mg infusion   IntraVENous ONCE Indra Schafer MD   Stopped at 10/17/19 1510     Current Outpatient Medications on File Prior to Encounter   Medication Sig Dispense Refill    amLODIPine (NORVASC) 10 mg tablet TAKE ONE TABLET BY MOUTH DAILY FOR BLOOD PRESSURE 90 Tab 0    simvastatin (ZOCOR) 20 mg tablet TAKE ONE TABLET BY MOUTH NIGHTLY FOR CHOLESTEROL AND HEART 90 Tab 3    lisinopril (PRINIVIL, ZESTRIL) 20 mg tablet TAKE ONE TABLET BY MOUTH DAILY. Indications: pressure 90 Tab 3    VIT C/E/ZN/COPPR/LUTEIN/ZEAXAN (PRESERVISION AREDS 2 PO) Take 1 Tab by mouth two (2) times a day. Review of Systems   Review of Systems   Constitutional: Negative for appetite change, chills and fever. Respiratory: Positive for shortness of breath. Negative for cough and chest tightness. Cardiovascular: Positive for chest pain. Negative for palpitations and leg swelling. Gastrointestinal: Negative for abdominal distention, abdominal pain, blood in stool, constipation, diarrhea, nausea and vomiting. Genitourinary: Negative for decreased urine volume, difficulty urinating, dysuria, flank pain, frequency and hematuria. Musculoskeletal: Positive for gait problem. Negative for arthralgias, joint swelling, myalgias, neck pain and neck stiffness. Neurological: Positive for tremors.  Negative for dizziness, seizures, syncope, facial asymmetry, speech difficulty, weakness, light-headedness, numbness and headaches. Hematological: Negative for adenopathy. All other systems reviewed and are negative. Physical Exam   Physical Exam   Constitutional: He is oriented to person, place, and time. Vital signs are normal. He appears well-developed and well-nourished. HENT:   Head: Atraumatic. Mouth/Throat: Oropharynx is clear and moist.   Eyes: Pupils are equal, round, and reactive to light. Conjunctivae and EOM are normal. No scleral icterus. Neck: Normal range of motion. Neck supple. No JVD present. Cardiovascular: Normal rate, regular rhythm, normal heart sounds and intact distal pulses. Pulmonary/Chest: Effort normal and breath sounds normal. He exhibits no tenderness. Abdominal: Soft. Bowel sounds are normal. He exhibits no distension. There is no tenderness. Musculoskeletal: Normal range of motion. He exhibits no edema. Neurological: He is alert and oriented to person, place, and time. He has normal strength. No cranial nerve deficit or sensory deficit. Gait abnormal. He displays no Babinski's sign on the right side. He displays no Babinski's sign on the left side. Reflex Scores:       Bicep reflexes are 2+ on the right side and 2+ on the left side. Patellar reflexes are 2+ on the right side and 2+ on the left side. Wide-based shuffling gait and intentional tremor in upper extremities   Skin: Skin is warm and dry. Nursing note and vitals reviewed.       Diagnostic Study Results     Labs -  Recent Results (from the past 24 hour(s))   CBC W/O DIFF    Collection Time: 10/17/19 10:55 AM   Result Value Ref Range    WBC 8.4 4.1 - 11.1 K/uL    RBC 4.97 4.10 - 5.70 M/uL    HGB 15.3 12.1 - 17.0 g/dL    HCT 46.8 36.6 - 50.3 %    MCV 94.2 80.0 - 99.0 FL    MCH 30.8 26.0 - 34.0 PG    MCHC 32.7 30.0 - 36.5 g/dL    RDW 13.0 11.5 - 14.5 %    PLATELET 758 129 - 993 K/uL    MPV 9.1 8.9 - 12.9 FL    NRBC 0.0 0  WBC    ABSOLUTE NRBC 0.00 0.00 - 0.01 K/uL ETHYL ALCOHOL    Collection Time: 10/17/19 10:55 AM   Result Value Ref Range    ALCOHOL(ETHYL),SERUM <10 <88 MG/DL   METABOLIC PANEL, COMPREHENSIVE    Collection Time: 10/17/19 10:55 AM   Result Value Ref Range    Sodium 138 136 - 145 mmol/L    Potassium 5.5 (H) 3.5 - 5.1 mmol/L    Chloride 101 97 - 108 mmol/L    CO2 27 21 - 32 mmol/L    Anion gap 10 5 - 15 mmol/L    Glucose 105 (H) 65 - 100 mg/dL    BUN 12 6 - 20 MG/DL    Creatinine 0.93 0.70 - 1.30 MG/DL    BUN/Creatinine ratio 13 12 - 20      GFR est AA >60 >60 ml/min/1.73m2    GFR est non-AA >60 >60 ml/min/1.73m2    Calcium 9.4 8.5 - 10.1 MG/DL    Bilirubin, total 0.6 0.2 - 1.0 MG/DL    ALT (SGPT) 49 12 - 78 U/L    AST (SGOT) 59 (H) 15 - 37 U/L    Alk. phosphatase 83 45 - 117 U/L    Protein, total 7.7 6.4 - 8.2 g/dL    Albumin 3.8 3.5 - 5.0 g/dL    Globulin 3.9 2.0 - 4.0 g/dL    A-G Ratio 1.0 (L) 1.1 - 2.2     MAGNESIUM    Collection Time: 10/17/19 10:55 AM   Result Value Ref Range    Magnesium 1.7 1.6 - 2.4 mg/dL   SAMPLES BEING HELD    Collection Time: 10/17/19 10:55 AM   Result Value Ref Range    SAMPLES BEING HELD 1SST, 1BLUE     COMMENT        Add-on orders for these samples will be processed based on acceptable specimen integrity and analyte stability, which may vary by analyte. CBC WITH AUTOMATED DIFF    Collection Time: 10/17/19  7:07 PM   Result Value Ref Range    WBC 5.1 4.1 - 11.1 K/uL    RBC 4.77 4.10 - 5.70 M/uL    HGB 14.9 12.1 - 17.0 g/dL    HCT 44.7 36.6 - 50.3 %    MCV 93.7 80.0 - 99.0 FL    MCH 31.2 26.0 - 34.0 PG    MCHC 33.3 30.0 - 36.5 g/dL    RDW 12.9 11.5 - 14.5 %    PLATELET 513 004 - 877 K/uL    MPV 8.9 8.9 - 12.9 FL    NRBC 0.0 0  WBC    ABSOLUTE NRBC 0.00 0.00 - 0.01 K/uL    NEUTROPHILS 70 32 - 75 %    LYMPHOCYTES 18 12 - 49 %    MONOCYTES 12 5 - 13 %    EOSINOPHILS 0 0 - 7 %    BASOPHILS 0 0 - 1 %    IMMATURE GRANULOCYTES 0 0.0 - 0.5 %    ABS. NEUTROPHILS 3.5 1.8 - 8.0 K/UL    ABS. LYMPHOCYTES 0.9 0.8 - 3.5 K/UL    ABS. MONOCYTES 0.6 0.0 - 1.0 K/UL    ABS. EOSINOPHILS 0.0 0.0 - 0.4 K/UL    ABS. BASOPHILS 0.0 0.0 - 0.1 K/UL    ABS. IMM. GRANS. 0.0 0.00 - 0.04 K/UL    DF AUTOMATED     METABOLIC PANEL, COMPREHENSIVE    Collection Time: 10/17/19  7:07 PM   Result Value Ref Range    Sodium 138 136 - 145 mmol/L    Potassium 3.5 3.5 - 5.1 mmol/L    Chloride 103 97 - 108 mmol/L    CO2 26 21 - 32 mmol/L    Anion gap 9 5 - 15 mmol/L    Glucose 88 65 - 100 mg/dL    BUN 9 6 - 20 MG/DL    Creatinine 0.85 0.70 - 1.30 MG/DL    BUN/Creatinine ratio 11 (L) 12 - 20      GFR est AA >60 >60 ml/min/1.73m2    GFR est non-AA >60 >60 ml/min/1.73m2    Calcium 8.7 8.5 - 10.1 MG/DL    Bilirubin, total 0.8 0.2 - 1.0 MG/DL    ALT (SGPT) 47 12 - 78 U/L    AST (SGOT) 45 (H) 15 - 37 U/L    Alk.  phosphatase 83 45 - 117 U/L    Protein, total 7.4 6.4 - 8.2 g/dL    Albumin 3.5 3.5 - 5.0 g/dL    Globulin 3.9 2.0 - 4.0 g/dL    A-G Ratio 0.9 (L) 1.1 - 2.2     MAGNESIUM    Collection Time: 10/17/19  7:07 PM   Result Value Ref Range    Magnesium 1.8 1.6 - 2.4 mg/dL   LIPASE    Collection Time: 10/17/19  7:07 PM   Result Value Ref Range    Lipase 236 73 - 393 U/L   ETHYL ALCOHOL    Collection Time: 10/17/19  7:07 PM   Result Value Ref Range    ALCOHOL(ETHYL),SERUM <10 <10 MG/DL   TROPONIN I    Collection Time: 10/17/19  7:07 PM   Result Value Ref Range    Troponin-I, Qt. <0.05 <0.05 ng/mL   NT-PRO BNP    Collection Time: 10/17/19  7:07 PM   Result Value Ref Range    NT pro- <450 PG/ML   URINALYSIS W/ REFLEX CULTURE    Collection Time: 10/18/19 12:04 AM   Result Value Ref Range    Color YELLOW/STRAW      Appearance CLEAR CLEAR      Specific gravity 1.008 1.003 - 1.030      pH (UA) 7.0 5.0 - 8.0      Protein NEGATIVE  NEG mg/dL    Glucose NEGATIVE  NEG mg/dL    Ketone 40 (A) NEG mg/dL    Bilirubin NEGATIVE  NEG      Blood NEGATIVE  NEG      Urobilinogen 0.2 0.2 - 1.0 EU/dL    Nitrites NEGATIVE  NEG      Leukocyte Esterase NEGATIVE  NEG      WBC 0-4 0 - 4 /hpf    RBC 0-5 0 - 5 /hpf    Epithelial cells FEW FEW /lpf    Bacteria NEGATIVE  NEG /hpf    UA:UC IF INDICATED CULTURE NOT INDICATED BY UA RESULT CNI      Hyaline cast 0-2 0 - 5 /lpf       Radiologic Studies -   No orders to display         Medical Decision Making   I am the first provider for this patient. Records Reviewed: I reviewed our electronic medical record system for any past medical records that were available that may contribute to the patient's current condition, including their PMH, surgical history, social and family history. Reviewed the nursing notes and vital signs from today's visit. Vital Signs-Reviewed the patient's vital signs. Patient Vitals for the past 24 hrs:   Temp Pulse Resp BP SpO2   10/17/19 1945  81 15 137/73 94 %   10/17/19 1909     97 %   10/17/19 1845  78 13 139/62 96 %   10/17/19 1800 97.8 °F (36.6 °C) 89 16 138/75 97 %       Provider Notes (Medical Decision Making):   Mr. Kenneth Malhotra is transferred to us from [facility] for further management of alcohol withdrawal. He has several complaints and although they are likely related to his alcohol consumption, he does not appear to be in severe withdrawal. His CIWA score several hours after his last ativan dose is 2 and his vitals are normal. While the cause of his gait instability is not yet fully clear, I ultimately recommend hospital admission because he lives alone and is not safe walking without assistance. Problem list:  1. etoh dependence - he appears slightly diaphoretic, disheveled, reports daily drinking for many years but usually not more than 2-3 drinks per day. His tremor and gait instability seem to have been worsened after he stopped drinking and alcohol withdrawal may play a role in this. He is also a poor historian and is not clearly able to pinpoint when all of these symptoms began, so their chronicity is not clear.  I have spoken to his son over the phone who states that the patient normally does not have so much trouble with balance/gait, so I presume that this decline is subacute over weeks. I have given him thiamine. Will replete any electrolytes as needed. Continue monitoring closely, repeat ciwa. 2. Gait instability and tremor -  Perhaps due to long-standing alcohol use or due to withdrawal. I did get a ct head today to ensure there is no SDH or another intracranial abnormality. 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 Given chronicity of the neurologic issues and progression over weeks at least, I do not think that emergent further workup in the ed is warranted, but I would recommend inpatient mri and neuro involvement if needed. Neuro exam shows intentional tremor in hands. Wide-based, shuffling gait. Normal strength. No sensory loss. No focal or CN deficits. 3. CP and SOB - patient now asymptomatic with normal vital signs. Normal ecg and negative trop make ACS unlikely particularly as this also has been a more chronic issue. bnp is normal ruling out chf. CXR also normal. Given his age, I recommend stress test inpatient or shortly after discharge to further stratify his heart disease risk. ED Course:   Initial assessment performed. The patients presenting problems have been discussed, and they are in agreement with the care plan formulated and outlined with them. I have encouraged them to ask questions as they arise throughout their visit. Medications Administered During ED Course:  Medications   0.9% sodium chloride 1,000 mL with mvi, adult no. 4 with vit K 10 mL, thiamine 473 mg, folic acid 1 mg infusion ( IntraVENous New Bag 10/17/19 1937)     DISPOSITION: ADMIT  Patient is being admitted to the hospital.  Their test results and reasons for admission have been discussed. The patient and/or available family express agreement with and understanding of the need to be admitted and their admission diagnosis. Thank you for resuming the care of this patient.   Please don't hesitate to contact me in the emergency department if you  have any additional questions. Maggi Garza MD, MSc        Diagnosis     Clinical Impression:   1. Alcohol abuse    2. Ataxia    3. Tremor        Attestation:  I personally performed the services described in this documentation on this date 10/17/2019 for patient Pedro Pablo Findana. Maggi Garza MD    Please note that this dictation was completed with [employer], the computer voice recognition software. Quite often unanticipated grammatical, syntax, homophones, and other interpretive errors are inadvertently transcribed by the computer software. Please disregard these errors. Please excuse any errors that have escaped final proofreading.

## 2019-10-18 NOTE — H&P
Hospitalist Admission Note    NAME: Kyra Alarcon   :  1945   MRN:  [id number]     Date/Time:  10/18/2019 5:16 AM    Patient PCP: Amy Fatima, DO  ______________________________________________________________________  Given the patient's current clinical presentation, I have a high level of concern for decompensation if discharged from the emergency department. Complex decision making was performed, which includes reviewing the patient's available past medical records, laboratory results, and x-ray films. My assessment of this patient's clinical condition and my plan of care is as follows. Assessment / Plan:    Tremor associated with unsteady gait most likely secondary to alcohol withdrawal versus acute stroke versus versus vertebrobasilar insufficiency syndrome  Admit patient to telemetry unit  Stroke work-up  Neurology consultation  PT OT evaluation  brain MRI at a.m.     Hypertensioncontinue home antihypertensive medication    Hyperlipidemia  Continue Zocor    Alcohol withdrawal  Start patient on CI WA protocol      Chest pain  Follow-up serial troponin  echocardiogram      Code Status: Full  Surrogate Decision Maker:Fidencio Felton    DVT Prophylaxis: Lovenox   GI Prophylaxis: not indicated          Subjective:   CHIEF COMPLAINT: tremor     HISTORY OF PRESENT ILLNESS:     74years old male with past medical history significant for alcohol abuse, hypertension, hyperlipidemia was transferred from [facility] for evaluation of tremor associated with gait abnormality associated with chest pain, patient has a long history of alcohol abuse and he stopped drinking yesterday, patient stated he been complaining from unsteady gait for a long time but since yesterday he been complaining from tremor associated with worsening unsteady gait patient denies any focal weakness or numbness, patient stated he drank about 2-3 drink every day had a CT scan was done and show no acute intracranial process severe bilateral vertebral artery calcification can be associated with vertebrobasilar insufficiency syndrome. We were asked to admit for work up and evaluation of the above problems. Past Medical History:   Diagnosis Date    Gout     Hypercholesterolemia     Hypertension     Hypokalemia         Past Surgical History:   Procedure Laterality Date    COLONOSCOPY N/A 4/23/2018    COLONOSCOPY performed by Richard Yates MD at [address], COLON, DIAGNOSTIC      HX COLONOSCOPY  2011    HX COLONOSCOPY  2018    polyps    HX CYST REMOVAL      rt foot x2    HX HEENT      bilat cataract repair    HX ORTHOPAEDIC      left ankle fx  rt ankle fx    HX ORTHOPAEDIC      L hip repair       Social History     Tobacco Use    Smoking status: Never Smoker    Smokeless tobacco: Never Used   Substance Use Topics    Alcohol use: Yes     Alcohol/week: 12.5 - 15.0 standard drinks     Types: 15 - 18 Standard drinks or equivalent per week     Comment: katetaya        Family History   Problem Relation Age of Onset    Diabetes Mother     Dementia Mother     Heart Failure Father     Obesity Brother     Hypertension Brother      No Known Allergies     Prior to Admission medications    Medication Sig Start Date End Date Taking? Authorizing Provider   amLODIPine (NORVASC) 10 mg tablet TAKE ONE TABLET BY MOUTH DAILY FOR BLOOD PRESSURE 9/17/19   Michelle King, DO   simvastatin (ZOCOR) 20 mg tablet TAKE ONE TABLET BY MOUTH NIGHTLY FOR CHOLESTEROL AND HEART 5/3/19   Lotus King R, DO   lisinopril (PRINIVIL, ZESTRIL) 20 mg tablet TAKE ONE TABLET BY MOUTH DAILY. Indications: pressure 3/22/19   Lotus King R, DO   VIT C/E/ZN/COPPR/LUTEIN/ZEAXAN (PRESERVISION AREDS 2 PO) Take 1 Tab by mouth two (2) times a day. Provider, Historical       REVIEW OF SYSTEMS:     I am not able to complete the review of systems because:    The patient is intubated and sedated    The patient has altered mental status due to his acute medical problems    The patient has baseline aphasia from prior stroke(s)    The patient has baseline dementia and is not reliable historian    The patient is in acute medical distress and unable to provide information           Total of 12 systems reviewed as follows:       POSITIVE= underlined text  Negative = text not underlined  General:  fever, chills, sweats, generalized weakness, weight loss/gain,      loss of appetite   Eyes:    blurred vision, eye pain, loss of vision, double vision  ENT:    rhinorrhea, pharyngitis   Respiratory:   cough, sputum production, SOB, FLORES, wheezing, pleuritic pain   Cardiology:   chest pain, palpitations, orthopnea, PND, edema, syncope   Gastrointestinal:  abdominal pain , N/V, diarrhea, dysphagia, constipation, bleeding   Genitourinary:  frequency, urgency, dysuria, hematuria, incontinence   Muskuloskeletal :  arthralgia, myalgia, back pain  Hematology:  easy bruising, nose or gum bleeding, lymphadenopathy   Dermatological: rash, ulceration, pruritis, color change / jaundice  Endocrine:   hot flashes or polydipsia   Neurological:  headache, dizziness, confusion, focal weakness, paresthesia,     Speech difficulties, memory loss, gait difficulty  Psychological: Feelings of anxiety, depression, agitation    Objective:   VITALS:    Visit Vitals  /58 (BP Patient Position: Sitting)   Pulse 77   Temp 98.4 °F (36.9 °C)   Resp 16   Ht 5' 6\" (1.676 m)   Wt 76 kg (167 lb 8.8 oz)   SpO2 96%   BMI 27.04 kg/m²       PHYSICAL EXAM:    General:    Alert, cooperative, no distress, appears stated age. HEENT: Atraumatic, anicteric sclerae, pink conjunctivae     No oral ulcers, mucosa moist, throat clear, dentition fair  Neck:  Supple, symmetrical,  thyroid: non tender  Lungs:   Clear to auscultation bilaterally. No Wheezing or Rhonchi. No rales. Chest wall:  No tenderness  No Accessory muscle use.   Heart:   Regular rhythm,  No  murmur   No edema  Abdomen:   Soft, non-tender. Not distended. Bowel sounds normal  Extremities: No cyanosis. No clubbing,      Skin turgor normal, Capillary refill normal, Radial dial pulse 2+  Skin:     Not pale. Not Jaundiced  No rashes   Psych:  Good insight. Not depressed. Not anxious or agitated. Neurologic: EOMs intact. No facial asymmetry. No aphasia or slurred speech. Symmetrical strength, Sensation grossly intact. Alert and oriented X 3. Tremor     _______________________________________________________________________  Care Plan discussed with:    Comments   Patient y    Family      RN y    Care Manager                    Consultant:      _______________________________________________________________________  Expected  Disposition:   Home with Family y   HH/PT/OT/RN    SNF/LTC    MEE    ________________________________________________________________________  TOTAL TIME: 61  Minutes    Critical Care Provided     Minutes non procedure based      Comments    y Reviewed previous records   >50% of visit spent in counseling and coordination of care y Discussion with patient and/or family and questions answered       ________________________________________________________________________  Signed: Kyaw Schaefer MD    Procedures: see electronic medical records for all procedures/Xrays and details which were not copied into this note but were reviewed prior to creation of Plan.     LAB DATA REVIEWED:    Recent Results (from the past 24 hour(s))   CBC W/O DIFF    Collection Time: 10/17/19 10:55 AM   Result Value Ref Range    WBC 8.4 4.1 - 11.1 K/uL    RBC 4.97 4.10 - 5.70 M/uL    HGB 15.3 12.1 - 17.0 g/dL    HCT 46.8 36.6 - 50.3 %    MCV 94.2 80.0 - 99.0 FL    MCH 30.8 26.0 - 34.0 PG    MCHC 32.7 30.0 - 36.5 g/dL    RDW 13.0 11.5 - 14.5 %    PLATELET 848 794 - 017 K/uL    MPV 9.1 8.9 - 12.9 FL    NRBC 0.0 0  WBC    ABSOLUTE NRBC 0.00 0.00 - 0.01 K/uL   ETHYL ALCOHOL    Collection Time: 10/17/19 10:55 AM   Result Value Ref Range    ALCOHOL(ETHYL),SERUM <10 <13 MG/DL   METABOLIC PANEL, COMPREHENSIVE    Collection Time: 10/17/19 10:55 AM   Result Value Ref Range    Sodium 138 136 - 145 mmol/L    Potassium 5.5 (H) 3.5 - 5.1 mmol/L    Chloride 101 97 - 108 mmol/L    CO2 27 21 - 32 mmol/L    Anion gap 10 5 - 15 mmol/L    Glucose 105 (H) 65 - 100 mg/dL    BUN 12 6 - 20 MG/DL    Creatinine 0.93 0.70 - 1.30 MG/DL    BUN/Creatinine ratio 13 12 - 20      GFR est AA >60 >60 ml/min/1.73m2    GFR est non-AA >60 >60 ml/min/1.73m2    Calcium 9.4 8.5 - 10.1 MG/DL    Bilirubin, total 0.6 0.2 - 1.0 MG/DL    ALT (SGPT) 49 12 - 78 U/L    AST (SGOT) 59 (H) 15 - 37 U/L    Alk. phosphatase 83 45 - 117 U/L    Protein, total 7.7 6.4 - 8.2 g/dL    Albumin 3.8 3.5 - 5.0 g/dL    Globulin 3.9 2.0 - 4.0 g/dL    A-G Ratio 1.0 (L) 1.1 - 2.2     MAGNESIUM    Collection Time: 10/17/19 10:55 AM   Result Value Ref Range    Magnesium 1.7 1.6 - 2.4 mg/dL   SAMPLES BEING HELD    Collection Time: 10/17/19 10:55 AM   Result Value Ref Range    SAMPLES BEING HELD 1SST, 1BLUE     COMMENT        Add-on orders for these samples will be processed based on acceptable specimen integrity and analyte stability, which may vary by analyte. CBC WITH AUTOMATED DIFF    Collection Time: 10/17/19  7:07 PM   Result Value Ref Range    WBC 5.1 4.1 - 11.1 K/uL    RBC 4.77 4.10 - 5.70 M/uL    HGB 14.9 12.1 - 17.0 g/dL    HCT 44.7 36.6 - 50.3 %    MCV 93.7 80.0 - 99.0 FL    MCH 31.2 26.0 - 34.0 PG    MCHC 33.3 30.0 - 36.5 g/dL    RDW 12.9 11.5 - 14.5 %    PLATELET 526 796 - 144 K/uL    MPV 8.9 8.9 - 12.9 FL    NRBC 0.0 0  WBC    ABSOLUTE NRBC 0.00 0.00 - 0.01 K/uL    NEUTROPHILS 70 32 - 75 %    LYMPHOCYTES 18 12 - 49 %    MONOCYTES 12 5 - 13 %    EOSINOPHILS 0 0 - 7 %    BASOPHILS 0 0 - 1 %    IMMATURE GRANULOCYTES 0 0.0 - 0.5 %    ABS. NEUTROPHILS 3.5 1.8 - 8.0 K/UL    ABS. LYMPHOCYTES 0.9 0.8 - 3.5 K/UL    ABS. MONOCYTES 0.6 0.0 - 1.0 K/UL    ABS. EOSINOPHILS 0.0 0.0 - 0.4 K/UL    ABS. BASOPHILS 0.0 0.0 - 0.1 K/UL    ABS. IMM. GRANS. 0.0 0.00 - 0.04 K/UL    DF AUTOMATED     METABOLIC PANEL, COMPREHENSIVE    Collection Time: 10/17/19  7:07 PM   Result Value Ref Range    Sodium 138 136 - 145 mmol/L    Potassium 3.5 3.5 - 5.1 mmol/L    Chloride 103 97 - 108 mmol/L    CO2 26 21 - 32 mmol/L    Anion gap 9 5 - 15 mmol/L    Glucose 88 65 - 100 mg/dL    BUN 9 6 - 20 MG/DL    Creatinine 0.85 0.70 - 1.30 MG/DL    BUN/Creatinine ratio 11 (L) 12 - 20      GFR est AA >60 >60 ml/min/1.73m2    GFR est non-AA >60 >60 ml/min/1.73m2    Calcium 8.7 8.5 - 10.1 MG/DL    Bilirubin, total 0.8 0.2 - 1.0 MG/DL    ALT (SGPT) 47 12 - 78 U/L    AST (SGOT) 45 (H) 15 - 37 U/L    Alk.  phosphatase 83 45 - 117 U/L    Protein, total 7.4 6.4 - 8.2 g/dL    Albumin 3.5 3.5 - 5.0 g/dL    Globulin 3.9 2.0 - 4.0 g/dL    A-G Ratio 0.9 (L) 1.1 - 2.2     MAGNESIUM    Collection Time: 10/17/19  7:07 PM   Result Value Ref Range    Magnesium 1.8 1.6 - 2.4 mg/dL   LIPASE    Collection Time: 10/17/19  7:07 PM   Result Value Ref Range    Lipase 236 73 - 393 U/L   ETHYL ALCOHOL    Collection Time: 10/17/19  7:07 PM   Result Value Ref Range    ALCOHOL(ETHYL),SERUM <10 <10 MG/DL   TROPONIN I    Collection Time: 10/17/19  7:07 PM   Result Value Ref Range    Troponin-I, Qt. <0.05 <0.05 ng/mL   NT-PRO BNP    Collection Time: 10/17/19  7:07 PM   Result Value Ref Range    NT pro- <450 PG/ML   URINALYSIS W/ REFLEX CULTURE    Collection Time: 10/18/19 12:04 AM   Result Value Ref Range    Color YELLOW/STRAW      Appearance CLEAR CLEAR      Specific gravity 1.008 1.003 - 1.030      pH (UA) 7.0 5.0 - 8.0      Protein NEGATIVE  NEG mg/dL    Glucose NEGATIVE  NEG mg/dL    Ketone 40 (A) NEG mg/dL    Bilirubin NEGATIVE  NEG      Blood NEGATIVE  NEG      Urobilinogen 0.2 0.2 - 1.0 EU/dL    Nitrites NEGATIVE  NEG      Leukocyte Esterase NEGATIVE  NEG      WBC 0-4 0 - 4 /hpf    RBC 0-5 0 - 5 /hpf    Epithelial cells FEW FEW /lpf    Bacteria NEGATIVE  NEG /hpf    UA:UC IF INDICATED CULTURE NOT INDICATED BY UA RESULT CNI      Hyaline cast 0-2 0 - 5 /lpf

## 2019-10-18 NOTE — PROGRESS NOTES
CM attempted to see Pt but he is off the floor at testing. Pt will need  RN/PT/OT to eval and treat at discharge. CM will need to [facility] and [facility] agencies that cover his area. According to chart Pt lives in [place], [place] in [facility]. CM called [facility] office and they do cover [facility]. At [facility] and [facility] also cover the area. CM left a copy of the [facility] letter in the room for his review. CM placed copy on bedside chart that indicated copy left in room and Pt was off the unit and unable to sign. RN thought that Pt may be here through the weekend since he was [facility] protocol. CM will need to reattempt evaluation at later time and continue to monitor discharge plan.       Link, [address] [facility]

## 2019-10-18 NOTE — ED NOTES
Patient stood at the bedside to urinate. Patient is shaky and unsteady at this time without assistance. Patient placed back in bed at this time.

## 2019-10-19 LAB
ANION GAP SERPL CALC-SCNC: 7 MMOL/L (ref 5–15)
BUN SERPL-MCNC: 14 MG/DL (ref 6–20)
BUN/CREAT SERPL: 15 (ref 12–20)
CALCIUM SERPL-MCNC: 9.1 MG/DL (ref 8.5–10.1)
CHLORIDE SERPL-SCNC: 108 MMOL/L (ref 97–108)
CHOLEST SERPL-MCNC: 163 MG/DL
CO2 SERPL-SCNC: 25 MMOL/L (ref 21–32)
CREAT SERPL-MCNC: 0.96 MG/DL (ref 0.7–1.3)
GLUCOSE SERPL-MCNC: 112 MG/DL (ref 65–100)
HDLC SERPL-MCNC: 65 MG/DL
HDLC SERPL: 2.5 {RATIO} (ref 0–5)
LDLC SERPL CALC-MCNC: 60 MG/DL (ref 0–100)
LIPID PROFILE,FLP: ABNORMAL
POTASSIUM SERPL-SCNC: 4 MMOL/L (ref 3.5–5.1)
SODIUM SERPL-SCNC: 140 MMOL/L (ref 136–145)
T4 FREE SERPL-MCNC: 0.9 NG/DL (ref 0.8–1.5)
TRIGL SERPL-MCNC: 190 MG/DL (ref ?–150)
VLDLC SERPL CALC-MCNC: 38 MG/DL

## 2019-10-19 PROCEDURE — 74011250637 HC RX REV CODE- 250/637: Performed by: INTERNAL MEDICINE

## 2019-10-19 PROCEDURE — 65660000000 HC RM CCU STEPDOWN

## 2019-10-19 PROCEDURE — 86592 SYPHILIS TEST NON-TREP QUAL: CPT

## 2019-10-19 PROCEDURE — 80061 LIPID PANEL: CPT

## 2019-10-19 PROCEDURE — 36415 COLL VENOUS BLD VENIPUNCTURE: CPT

## 2019-10-19 PROCEDURE — 74011250637 HC RX REV CODE- 250/637: Performed by: EMERGENCY MEDICINE

## 2019-10-19 PROCEDURE — 80048 BASIC METABOLIC PNL TOTAL CA: CPT

## 2019-10-19 PROCEDURE — 74011250636 HC RX REV CODE- 250/636: Performed by: INTERNAL MEDICINE

## 2019-10-19 PROCEDURE — 84439 ASSAY OF FREE THYROXINE: CPT

## 2019-10-19 RX ORDER — ONDANSETRON 2 MG/ML
2 INJECTION INTRAMUSCULAR; INTRAVENOUS
Status: DISCONTINUED | OUTPATIENT
Start: 2019-10-19 | End: 2019-10-21 | Stop reason: HOSPADM

## 2019-10-19 RX ORDER — LORAZEPAM 0.5 MG/1
0.5 TABLET ORAL 3 TIMES DAILY
Status: COMPLETED | OUTPATIENT
Start: 2019-10-19 | End: 2019-10-20

## 2019-10-19 RX ADMIN — LORAZEPAM 1 MG: 1 TABLET ORAL at 08:58

## 2019-10-19 RX ADMIN — LORAZEPAM 0.5 MG: 0.5 TABLET ORAL at 21:25

## 2019-10-19 RX ADMIN — LISINOPRIL 20 MG: 20 TABLET ORAL at 08:58

## 2019-10-19 RX ADMIN — ATORVASTATIN CALCIUM 20 MG: 20 TABLET, FILM COATED ORAL at 08:58

## 2019-10-19 RX ADMIN — LORAZEPAM 1 MG: 2 INJECTION INTRAMUSCULAR; INTRAVENOUS at 01:32

## 2019-10-19 RX ADMIN — LORAZEPAM 0.5 MG: 0.5 TABLET ORAL at 17:08

## 2019-10-19 RX ADMIN — Medication 100 MG: at 09:06

## 2019-10-19 RX ADMIN — AMLODIPINE BESYLATE 10 MG: 5 TABLET ORAL at 08:58

## 2019-10-19 RX ADMIN — THERA TABS 1 TABLET: TAB at 08:58

## 2019-10-19 RX ADMIN — ASPIRIN 81 MG 81 MG: 81 TABLET ORAL at 08:58

## 2019-10-19 RX ADMIN — FOLIC ACID 1 MG: 1 TABLET ORAL at 08:58

## 2019-10-19 RX ADMIN — ENOXAPARIN SODIUM 40 MG: 40 INJECTION SUBCUTANEOUS at 08:58

## 2019-10-19 NOTE — PROGRESS NOTES
No surgery found *  * No surgery found *  Bedside and Verbal shift change report given to Mati Cabello R.N (oncoming nurse) by Maria Del Carmen uY R.N (offgoing nurse). Report included the following information SBAR and Kardex.     Zone Phone:   [phone]        Significant changes during shift:  none           Patient Information     Lord Ortiz  74 y.o.  10/17/2019  5:50 PM by Evangelina Ferreira MD. Lord Ortiz was admitted from Home     Problem List          Patient Active Problem List     Diagnosis Date Noted    Gait abnormality 10/18/2019    Bilateral carotid artery stenosis 10/18/2019    Vertebrobasilar artery insufficiency 10/18/2019    BMI 29.0-29.9,adult 03/22/2019    Encounter for immunization 01/13/2019    Hx of colonic polyp 04/23/2018    Diverticula of colon 04/23/2018    Status post left hip replacement 12/19/2014    Hypertension      Hypercholesterolemia             Past Medical History:   Diagnosis Date    Gout      Hypercholesterolemia      Hypertension      Hypokalemia              Core Measures:     CVA: No No  CHF:No No  PNA:No No     P  Activity Status:     OOB to Chair Yes  Ambulated this shift Yes   Bed Rest No     Supplemental O2: (If Applicable)     NC No  NRB No  Venti-mask No  On  Liters/min        LINES AND DRAINS:     Central Line? No Placement date  Reason Medically Necessary      PICC LINE? No Placement date Reason Medically Necessary      Urinary Catheter? No Placement Date  Reason Medically Necessary      DVT prophylaxis:     DVT prophylaxis Med- No  DVT prophylaxis SCD or KELLY- No      Wounds: (If Applicable)     Wounds- No     Location      Patient Safety:     Falls Score Total Score: 4  Safety Level_______  Bed Alarm On? No  Sitter? No     Plan for upcoming shift: poss dc           Discharge Plan:  Yes      Active Consults:  IP CONSULT TO HOSPITALIST  IP CONSULT TO NEUROLOGY

## 2019-10-19 NOTE — PROGRESS NOTES
Problem: Falls - Risk of  Goal: *Absence of Falls  Description  Document [facility] Fall Risk and appropriate interventions in the flowsheet.   Outcome: Progressing Towards Goal  Note:   Fall Risk Interventions:  Mobility Interventions: Bed/chair exit alarm         Medication Interventions: Bed/chair exit alarm         History of Falls Interventions: Bed/chair exit alarm         Problem: Patient Education: Go to Patient Education Activity  Goal: Patient/Family Education  Outcome: Progressing Towards Goal     Problem: Patient Education: Go to Patient Education Activity  Goal: Patient/Family Education  Outcome: Progressing Towards Goal     Problem: Patient Education: Go to Patient Education Activity  Goal: Patient/Family Education  Outcome: Progressing Towards Goal

## 2019-10-19 NOTE — PROGRESS NOTES
Progress Note      Pt Name  Barbra Mancuso   Date of Birth 1945   Medical Record Number  [id number]      Age  74 y.o. PCP Katie Bedolla DO   Admit date:  10/17/2019    Room Number  3117/01  @ [facility]   Date of Service  10/19/2019     Admission Diagnoses:  Tremor      Assessment and plan:        74years old male with a past medical history of heavy alcohol abuse, hypertension, hyperlipidemia who was transferred from [facility] for evaluation of tremors,  gait abnormality and chest pain.     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 Doppler of carotids unremarkable. B12, Vit D WNL. Elevated TSH. Pending immunofixation results. Ft4, RPR, lipid panel ordered. Discussed case with Neurology. On ASA and statin. PT OT recommended  PT/OT.      Alcohol use disorder  Alcohol withdrawal  Patient is willing to quit drinking. Start oral lorazepam 1 mg tid and plan to discharge home on tapering of po lorazepam.   Continue thiamine and MVM.      Atypical Chest pain- resolved  Negative serial troponin   Echocardiogram LVEF 51-55%, borderline low normal RV systolic dysfunction. No signs of CHF. Patient asymptomatic and hemodynamically stable. F/u echocardiogram in 6-12 months. Hypertension  Controlled. Continue home amlodipine and lisinopril     Hyperlipidemia  Lipid panel ordered.  On home Zocor          25.0 - 29.9 Overweight / Body mass index is 27.04 kg/m².     Code status: Full  Prophylaxis: SCD's  Recommended Disposition:  PT, OT, RN            CODE STATUS   Full     Functional Status  Pt resides in his own home in [address]   He was independent with ADLs PTA     Surrogate decision maker:  Pt's brother - who lives in [place]      Prophylaxis       Discharge Plan:   PT, OT, RN,     We will postpone his discharge today as we learned that his brother is coming to help get him situated back at pt's home tomorrow    Misc   Benefit:  Payor: [facility] Jose Cartwright / Plan: [place] MEDICARE PART A & B / Product Type: Medicare /    Isolation :  There are currently no Active Isolations   ADT status:  INPATIENT      Query   None noted today    Prognosis      Social issues  Date  Comment     10/19/19    3:32 PM No family or visitor here with the patient today                     Subjective Data     \"I am fine \"  Review of Systems - History obtained from the patient  Respiratory ROS: no cough, shortness of breath, or wheezing  Cardiovascular ROS: no chest pain or dyspnea on exertion    Objective Data       Comments  Pleasant thin built gentleman sitting on bedside chair in no distress        Patient Vitals for the past 24 hrs:   BP   10/19/19 1216 127/61   10/19/19 0811 125/66   10/19/19 0013 136/59   10/18/19 2009 137/75   10/18/19 1726 132/68      Patient Vitals for the past 24 hrs:   Pulse   10/19/19 1216 80   10/19/19 0811 83   10/19/19 0013 90   10/18/19 2009 84   10/18/19 1726 87      Patient Vitals for the past 24 hrs:   Resp   10/19/19 1216 18   10/19/19 0811 18   10/19/19 0013 18   10/18/19 2009 18   10/18/19 1726 18      Patient Vitals for the past 24 hrs:   Temp   10/19/19 1216 97.5 °F (36.4 °C)   10/19/19 0811 97.8 °F (36.6 °C)   10/19/19 0013 98.3 °F (36.8 °C)   10/18/19 2009 98.2 °F (36.8 °C)   10/18/19 1726 97.6 °F (36.4 °C)        SpO2 Readings from Last 6 Encounters:   10/19/19 100%   10/17/19 94%   10/17/19 96%   10/01/19 98%   03/22/19 99%   01/18/19 97%          O2 Device: Room air Body mass index is 27.04 kg/m².  -  Wt Readings from Last 10 Encounters:   10/18/19 76 kg (167 lb 8.8 oz)   10/18/19 76.7 kg (169 lb)   10/17/19 76 kg (167 lb 8.8 oz)   10/17/19 76.7 kg (169 lb)   10/01/19 76.8 kg (169 lb 6.4 oz)   03/22/19 81.6 kg (179 lb 12.8 oz)   01/18/19 83.5 kg (184 lb)   01/07/19 83.7 kg (184 lb 9.6 oz)   09/27/18 81.6 kg (179 lb 12.8 oz) 05/01/18 81.6 kg (180 lb)        Physical Exam:             General:  Alert, cooperative,   well noursished,   well developed,   appears stated age    Ears/Eyes:  Hearing intact  Sclera anicteric. Pupils equal   Mouth/Throat:     Neck:     Lungs:  Trachea midline  Chest excursion symmetrical   Auscultation B/L Symmetrical with   Vesicular breath sounds     No Crepitations noted          CVS:  Regular rate and rhythm   no  murmur,   No click, rub or gallop  S1 normal   S2 normal      Abdomen:     Extremities:  LEFT Elbow healing abrasion   No cyanosis, jaundice  No edema noted   No sign of DVT/cord like lesion on palpation  No sign of acute trauma   Age appropriate OA changes noted.     Skin:    Skin color, texture, turgor normal. no acute rash or lesions    Lymph nodes:     Musculoskeletal Muscle bulk B/L symmetrical   Neuro Cranial nerves are intact,   motor movement b/l symmetrical,      Psych:  Alert and oriented,   normal mood & affect          Medications reviewed     Current Facility-Administered Medications   Medication Dose Route Frequency    ondansetron (ZOFRAN) injection 4 mg  4 mg IntraVENous Q4H PRN    acetaminophen (TYLENOL) tablet 650 mg  650 mg Oral Q4H PRN    Or    acetaminophen (TYLENOL) solution 650 mg  650 mg Per NG tube Q4H PRN    Or    acetaminophen (TYLENOL) suppository 650 mg  650 mg Rectal Q4H PRN    aspirin chewable tablet 81 mg  81 mg Oral DAILY    bisacodyl (DULCOLAX) suppository 10 mg  10 mg Rectal DAILY PRN    enoxaparin (LOVENOX) injection 40 mg  40 mg SubCUTAneous Q24H    LORazepam (ATIVAN) injection 1 mg  1 mg IntraVENous Q1J PRN    folic acid (FOLVITE) tablet 1 mg  1 mg Oral DAILY    thiamine mononitrate (B-1) tablet 100 mg  100 mg Oral DAILY    atorvastatin (LIPITOR) tablet 20 mg  20 mg Oral DAILY    lisinopril (PRINIVIL, ZESTRIL) tablet 20 mg  20 mg Oral DAILY    amLODIPine (NORVASC) tablet 10 mg  10 mg Oral DAILY    LORazepam (ATIVAN) tablet 1 mg  1 mg Oral TID  therapeutic multivitamin (THERAGRAN) tablet 1 Tab  1 Tab Oral DAILY       Relevant other informations: Other medical conditions listed in [facility] hospital problem list section; all of these and other pertinent data were taken into consideration when treatment plan is developed and customized to this patient's unique overall circumstances and needs. We have reviewed available old medical records within the constraints of this admission process. Data Review:   Recent Days:   All Micro Results     None          Recent Labs     10/17/19  1907 10/17/19  1055   WBC 5.1 8.4   HGB 14.9 15.3   HCT 44.7 46.8    167     Recent Labs     10/19/19  0236 10/17/19  1907 10/17/19  1055    138 138   K 4.0 3.5 5.5*    103 101   CO2 25 26 27   * 88 105*   BUN 14 9 12   CREA 0.96 0.85 0.93   CA 9.1 8.7 9.4   MG  --  1.8 1.7   ALB  --  3.5 3.8   TBILI  --  0.8 0.6   SGOT  --  45* 59*   ALT  --  47 49      Lab Results   Component Value Date/Time    TSH 4.06 (H) 10/18/2019 04:34 AM            Care Plan discussed with:Patient/Family and Nurse   Other medical conditions are listed in the active hospital problem list section; these and other pertinent data were taken into consideration when the treatment plan was developed and customized to this patient's unique overall circumstances and needs. High complexity decision making was performed for this patient who is at high risk for decompensation with multiple organ involvement. Today total floor/unit time was 35 minutes while caring for this patient and greater than 50% of that time was spent with patient (and/or family) coordinating patients clinical issues; this includes time spent during multidisciplinary rounds.         Codi Tobias MD MPH FACP    10/19/2019

## 2019-10-19 NOTE — PROGRESS NOTES
Neurology Progress Note    Patient ID:  Barbra Mancuso  [id number]  74 y.o.  1945    CC: problems walking    Subjective:      Patient reports no new issues. No tremors at rest was observed. Brain MRI done revealed no acute stroke. Probable 4 mm right parietal cavernous malformation. LDL was 60. B12 level was normal.  Preliminary results of BiPAP was normal.  Elevated TSH with normal free T4.  RPR is pending. Current Facility-Administered Medications   Medication Dose Route Frequency    ondansetron (ZOFRAN) injection 4 mg  4 mg IntraVENous Q4H PRN    acetaminophen (TYLENOL) tablet 650 mg  650 mg Oral Q4H PRN    Or    acetaminophen (TYLENOL) solution 650 mg  650 mg Per NG tube Q4H PRN    Or    acetaminophen (TYLENOL) suppository 650 mg  650 mg Rectal Q4H PRN    aspirin chewable tablet 81 mg  81 mg Oral DAILY    bisacodyl (DULCOLAX) suppository 10 mg  10 mg Rectal DAILY PRN    enoxaparin (LOVENOX) injection 40 mg  40 mg SubCUTAneous Q24H    LORazepam (ATIVAN) injection 1 mg  1 mg IntraVENous Y7O PRN    folic acid (FOLVITE) tablet 1 mg  1 mg Oral DAILY    thiamine mononitrate (B-1) tablet 100 mg  100 mg Oral DAILY    atorvastatin (LIPITOR) tablet 20 mg  20 mg Oral DAILY    lisinopril (PRINIVIL, ZESTRIL) tablet 20 mg  20 mg Oral DAILY    amLODIPine (NORVASC) tablet 10 mg  10 mg Oral DAILY    LORazepam (ATIVAN) tablet 1 mg  1 mg Oral TID    therapeutic multivitamin (THERAGRAN) tablet 1 Tab  1 Tab Oral DAILY        Review of Systems:    Pertinent items are noted in HPI. Objective:     Patient Vitals for the past 8 hrs:   BP Temp Pulse Resp SpO2   10/19/19 1216 127/61 97.5 °F (36.4 °C) 80 18 100 %   10/19/19 0811 125/66 97.8 °F (36.6 °C) 83 18 97 %       10/19 0701 - 10/19 1900  In: 840 [P.O.:840]  Out: -   10/17 1901 - 10/19 0700  In: 640 [P.O.:240;  I.V.:400]  Out: -     Lab Review   Recent Results (from the past 24 hour(s))   ECHO ADULT COMPLETE    Collection Time: 10/18/19  4:36 PM Result Value Ref Range    Right Atrial Area 4C 13.42 cm2    LV E' Lateral Velocity 8.25 cm/s    LV E' Septal Velocity 8.69 cm/s    Ao Root D 3.56 cm    Aortic Valve Systolic Peak Velocity 028.36 cm/s    Aortic Valve Area by Continuity of Peak Velocity 3.8 cm2    AoV PG 5.0 mmHg    LVIDd 3.72 (A) 4.2 - 5.9 cm    LVPWd 0.95 0.6 - 1.0 cm    LVIDs 3.39 cm    IVSd 1.67 (A) 0.6 - 1.0 cm    LVOT d 2.50 cm    LVOT Peak Velocity 87.04 cm/s    LVOT Peak Gradient 3.0 mmHg    LVOT VTI 19.06 cm    MV A Jhonatan 63.77 cm/s    MV E Jhonatan 75.42 cm/s    MV E/A 1.18     MV Mean Gradient 1.4 mmHg    Mitral Valve Annulus Velocity Time Integral 27.18 cm    Left Atrium to Aortic Root Ratio 0.66     LA Vol 4C 33.09 18 - 58 mL    LA Area 4C 15.1 cm2    LV Mass .4 88 - 224 g    LV Mass AL Index 106.4 49 - 115 g/m2    LVPWs 1.20 cm    E/E' lateral 9.14     E/E' septal 8.68     RVSP 32.4 mmHg    MV Peak Gradient 2.7 mmHg    E/E' ratio (averaged) 8.91     Est. RA Pressure 10.0 mmHg    Mitral Regurgitant Peak Velocity 138.79 cm/s    Mitral Valve E Wave Deceleration Time 276.4 ms    Left Atrium Major Axis 2.33 cm    Triscuspid Valve Regurgitation Peak Gradient 22.4 mmHg    Aortic Regurgitant Pressure Half-time 858.1 cm    Mitral Valve Max Velocity 82.69 cm/s    MVA VTI 3.4 cm2    LVOT SV 93.3 ml    TR Max Velocity 236.85 cm/s    PASP 32.4 mmHg    LA Vol Index 17.84 16 - 28 ml/m2    MR Peak Gradient 7.7 mmHg    JOSE ANTONIO/BSA Pk Jhonatan 2.0 cm2/m2    AR Max Jhonatan 383.83 cm/s   LIPID PANEL    Collection Time: 10/19/19  2:36 AM   Result Value Ref Range    LIPID PROFILE          Cholesterol, total 163 <200 MG/DL    Triglyceride 190 (H) <150 MG/DL    HDL Cholesterol 65 MG/DL    LDL, calculated 60 0 - 100 MG/DL    VLDL, calculated 38 MG/DL    CHOL/HDL Ratio 2.5 0.0 - 5.0     METABOLIC PANEL, BASIC    Collection Time: 10/19/19  2:36 AM   Result Value Ref Range    Sodium 140 136 - 145 mmol/L    Potassium 4.0 3.5 - 5.1 mmol/L    Chloride 108 97 - 108 mmol/L    CO2 25 21 - 32 mmol/L    Anion gap 7 5 - 15 mmol/L    Glucose 112 (H) 65 - 100 mg/dL    BUN 14 6 - 20 MG/DL    Creatinine 0.96 0.70 - 1.30 MG/DL    BUN/Creatinine ratio 15 12 - 20      GFR est AA >60 >60 ml/min/1.73m2    GFR est non-AA >60 >60 ml/min/1.73m2    Calcium 9.1 8.5 - 10.1 MG/DL   T4, FREE    Collection Time: 10/19/19  2:36 AM   Result Value Ref Range    T4, Free 0.9 0.8 - 1.5 NG/DL       PHYSICAL EXAM:    NEUROLOGICAL EXAM:    Appearance: The patient is well developed, well nourished, provides a coherent history and is in no acute distress. Mental Status: Oriented to time, place and person. Fluent, no aphasia or dysarthria. Mood and affect appropriate. Cranial Nerves:   Intact visual fields. DANNY, EOM's full, no nystagmus, no ptosis. Facial sensation is normal. Corneal reflexes are intact. Facial movement is symmetric. Hearing is normal bilaterally. Palate is midline with normal elevation. Sternocleidomastoid and trapezius muscles are normal. Tongue is midline. Motor:  5/5 strength in upper and lower proximal and distal muscles. Normal bulk and tone. No pronator drift. Reflexes:   Deep tendon reflexes 1+ UE and trace knees and 0 ankles. Toes equivocal.    Sensory:   Stocking sensory deficit. Gait:  Unsteady. Tremor:   Intentional>postural tremors. Cerebellar:  Intact FTN/MARCIE/HTS. Assessment:   Alcoholic neuropathy  Tremors  Gait instability    Plan:   Neurological examination reveals intentional greater than postural upper extremity tremors, stocking sensory deficit and gait ataxia. Findings consistent with distal lower extremity polyneuropathy likely secondary to chronic alcohol use. Suspect also some cerebellar involvement to the ataxia as well as the tremors. Again secondary to chronic alcohol use. Brain MRI did not reveal any acute stroke. Probable 4 mm right parietal cavernous malformation. No intervention is necessary.     Neuropathy laboratory work-up was done and was unremarkable except for slightly elevated TSH. RPR is pending. Patient advised regarding cessation of alcohol drinking. However given the chronicity he may require assistance of his primary care physician or substance abuse program to prevent withdrawal.    Patient may benefit from inpatient rehab. Patient advised to use all necessary equipment to prevent from falling. No further recommendations from a neurological standpoint. Follow-up with outpatient neurology in 4 weeks.     Signed:  Oswaldo Gabriel MD  10/19/2019  12:40 PM

## 2019-10-19 NOTE — PROGRESS NOTES
Problem: Falls - Risk of  Goal: *Absence of Falls  Description  Document Lyndonrosalina Marupam Fall Risk and appropriate interventions in the flowsheet.   Note:   Fall Risk Interventions:  Mobility Interventions: Bed/chair exit alarm, Patient to call before getting OOB    Mentation Interventions: Adequate sleep, hydration, pain control, Bed/chair exit alarm, More frequent rounding    Medication Interventions: Bed/chair exit alarm, Patient to call before getting OOB    Elimination Interventions: Bed/chair exit alarm, Call light in reach, Toileting schedule/hourly rounds    History of Falls Interventions: Bed/chair exit alarm

## 2019-10-19 NOTE — PROGRESS NOTES
* No surgery found *  * No surgery found *  Bedside and Verbal shift change report given to carolina (oncoming nurse) by Adalberto Nino (offgoing nurse). Report included the following information SBAR and Kardex. Zone Phone:   [phone]      Significant changes during shift:  none        Patient Information    Kamran Guy  74 y.o.  10/17/2019  5:50 PM by Eulalia Butterfield MD. Kamran Guy was admitted from Home    Problem List    Patient Active Problem List    Diagnosis Date Noted    Gait abnormality 10/18/2019    Bilateral carotid artery stenosis 10/18/2019    Vertebrobasilar artery insufficiency 10/18/2019    BMI 29.0-29.9,adult 03/22/2019    Encounter for immunization 01/13/2019    Hx of colonic polyp 04/23/2018    Diverticula of colon 04/23/2018    Status post left hip replacement 12/19/2014    Hypertension     Hypercholesterolemia      Past Medical History:   Diagnosis Date    Gout     Hypercholesterolemia     Hypertension     Hypokalemia          Core Measures:    CVA: No No  CHF:No No  PNA:No No    P  Activity Status:    OOB to Chair Yes  Ambulated this shift Yes   Bed Rest No    Supplemental O2: (If Applicable)    NC No  NRB No  Venti-mask No  On  Liters/min      LINES AND DRAINS:    Central Line? No Placement date  Reason Medically Necessary     PICC LINE? No Placement date Reason Medically Necessary     Urinary Catheter? No Placement Date  Reason Medically Necessary     DVT prophylaxis:    DVT prophylaxis Med- No  DVT prophylaxis SCD or KELLY- No     Wounds: (If Applicable)    Wounds- No    Location     Patient Safety:    Falls Score Total Score: 4  Safety Level_______  Bed Alarm On? No  Sitter?  No    Plan for upcoming shift: poss dc        Discharge Plan: Yes     Active Consults:  IP CONSULT TO HOSPITALIST  IP CONSULT TO NEUROLOGY

## 2019-10-19 NOTE — PROGRESS NOTES
Pt. was found standing by the door with blood on the floor (pt. was bleeding because he pulled his IV). Pt. was redirected and assisted to chair with self releasing belt.

## 2019-10-20 PROCEDURE — 74011250636 HC RX REV CODE- 250/636: Performed by: INTERNAL MEDICINE

## 2019-10-20 PROCEDURE — 74011250637 HC RX REV CODE- 250/637: Performed by: INTERNAL MEDICINE

## 2019-10-20 PROCEDURE — 65660000000 HC RM CCU STEPDOWN

## 2019-10-20 PROCEDURE — 94760 N-INVAS EAR/PLS OXIMETRY 1: CPT

## 2019-10-20 PROCEDURE — 74011250637 HC RX REV CODE- 250/637: Performed by: EMERGENCY MEDICINE

## 2019-10-20 RX ADMIN — ASPIRIN 81 MG 81 MG: 81 TABLET ORAL at 08:35

## 2019-10-20 RX ADMIN — LISINOPRIL 20 MG: 20 TABLET ORAL at 08:35

## 2019-10-20 RX ADMIN — LORAZEPAM 0.5 MG: 0.5 TABLET ORAL at 21:36

## 2019-10-20 RX ADMIN — FOLIC ACID 1 MG: 1 TABLET ORAL at 08:35

## 2019-10-20 RX ADMIN — LORAZEPAM 0.5 MG: 0.5 TABLET ORAL at 08:35

## 2019-10-20 RX ADMIN — THERA TABS 1 TABLET: TAB at 08:35

## 2019-10-20 RX ADMIN — AMLODIPINE BESYLATE 10 MG: 5 TABLET ORAL at 08:35

## 2019-10-20 RX ADMIN — LORAZEPAM 0.5 MG: 0.5 TABLET ORAL at 17:28

## 2019-10-20 RX ADMIN — ATORVASTATIN CALCIUM 20 MG: 20 TABLET, FILM COATED ORAL at 08:35

## 2019-10-20 RX ADMIN — ENOXAPARIN SODIUM 40 MG: 40 INJECTION SUBCUTANEOUS at 08:34

## 2019-10-20 RX ADMIN — Medication 100 MG: at 08:35

## 2019-10-20 NOTE — PROGRESS NOTES
Problem: Falls - Risk of  Goal: *Absence of Falls  Description  Document Mary Quigley Fall Risk and appropriate interventions in the flowsheet.   Outcome: Progressing Towards Goal  Note:   Fall Risk Interventions:  Mobility Interventions: Bed/chair exit alarm, Patient to call before getting OOB    Mentation Interventions: Bed/chair exit alarm, More frequent rounding    Medication Interventions: Bed/chair exit alarm, Patient to call before getting OOB, Teach patient to arise slowly    Elimination Interventions: Bed/chair exit alarm, Call light in reach, Toileting schedule/hourly rounds    History of Falls Interventions: Bed/chair exit alarm, Door open when patient unattended

## 2019-10-20 NOTE — PROGRESS NOTES
No surgery found *  * No surgery found *  Bedside and Verbal shift change report given to Alfreda HOLBROOK (oncoming nurse) by Alphonso Dewitt R.N (offgoing nurse).  Report included the following information SBAR and Kardex.     Zone Phone:   [phone]        Significant changes during shift:  none           Patient Information     Matt Graham  74 y.o.  10/17/2019  5:50 PM by Yogesh Vera MD. Krish Felton was admitted from Home     Problem List             Patient Active Problem List     Diagnosis Date Noted    Gait abnormality 10/18/2019    Bilateral carotid artery stenosis 10/18/2019    Vertebrobasilar artery insufficiency 10/18/2019    BMI 29.0-29.9,adult 03/22/2019    Encounter for immunization 01/13/2019    Hx of colonic polyp 04/23/2018    Diverticula of colon 04/23/2018    Status post left hip replacement 12/19/2014    Hypertension      Hypercholesterolemia                Past Medical History:   Diagnosis Date    Gout      Hypercholesterolemia      Hypertension      Hypokalemia              Core Measures:     CVA: No No  CHF:No No  PNA:No No     P  Activity Status:     OOB to Chair Yes  Ambulated this shift Yes   Bed Rest No     Supplemental G6: (VK Applicable)     NC No  NRB No  Venti-mask No  On  Liters/min        LINES AND DRAINS:     Central Line? No      PICC LINE? No      Urinary Catheter? No     DVT prophylaxis:     DVT prophylaxis Med- No  DVT prophylaxis SCD or KELLY- No      Wounds: (If Applicable)     Wounds- No     Location      Patient Safety:     Falls Score Total Score: 4  Safety Level_______  Bed Alarm On? No  Sitter? No     Plan for upcoming shift: poss dc           Discharge Plan: Yes      Active Consults:  IP CONSULT TO HOSPITALIST  IP CONSULT TO NEUROLOGY

## 2019-10-20 NOTE — PROGRESS NOTES
Progress Note      Pt Name  Brenna Diggs   Date of Birth 1945   Medical Record Number  [id number]      Age  74 y.o. PCP Tierra Dimas DO   Admit date:  10/17/2019    Room Number  3117/01  @ [facility]   Date of Service  10/20/2019     Admission Diagnoses:  Tremor      Assessment and plan:        74years old male with a past medical history of heavy alcohol abuse, hypertension, hyperlipidemia who was transferred from [facility] for evaluation of tremors,  gait abnormality and chest pain.     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 Doppler of carotids unremarkable. B12, Vit D WNL. Elevated TSH. Pending immunofixation results. Ft4, RPR, lipid panel ordered. Discussed case with Neurology. On ASA and statin. PT OT recommended  PT/OT.      Alcohol use disorder  Alcohol withdrawal  Patient is willing to quit drinking. Start oral lorazepam 1 mg tid and plan to discharge home on tapering of po lorazepam.   Continue thiamine and MVM.      Atypical Chest pain- resolved  Negative serial troponin   Echocardiogram LVEF 51-55%, borderline low normal RV systolic dysfunction. No signs of CHF. Patient asymptomatic and hemodynamically stable. F/u echocardiogram in 6-12 months. Hypertension  Controlled. Continue home amlodipine and lisinopril     Hyperlipidemia  Lipid panel ordered.  On home Zocor          25.0 - 29.9 Overweight / Body mass index is 27.04 kg/m².     Code status: Full  Prophylaxis: SCD's  Recommended Disposition:  PT, OT, RN            CODE STATUS   Full     Functional Status  Pt resides in his own home in [address]   He was independent with ADLs PTA     Surrogate decision maker:  Pt's brother - who lives in [place]      Prophylaxis       Discharge Plan:   PT, OT, RN,     We will postpone his discharge today as we learned that his brother is coming to help get him situated back at pt's home tomorrow      Misc   Benefit:  Payor: [facility] MEDICARE / Plan: VA MEDICARE PART A & B / Product Type: Medicare /    Isolation :  There are currently no Active Isolations   ADT status:  INPATIENT      Query   None noted today    Prognosis      Social issues  Date  Comment     10/19/19    3:32 PM No family or visitor here with the patient today      10/20/19  1:44 PM spoke with Dr. Sherren Pill pt's brother in [facility] GI specialist in [place]. We discussed plans of care and discharge               Subjective Data     \"OK  \"  Review of Systems - History obtained from the patient  Respiratory ROS: no cough, shortness of breath, or wheezing  Cardiovascular ROS: no chest pain or dyspnea on exertion    Objective Data       Comments  Pleasant thin built gentleman sitting on bedside chair in no distress        Patient Vitals for the past 24 hrs:   BP   10/20/19 1144 116/72   10/20/19 0647 125/49   10/20/19 0346 117/77   10/19/19 2311 116/59   10/19/19 1934 118/62   10/19/19 1518 115/58      Patient Vitals for the past 24 hrs:   Pulse   10/20/19 1144 83   10/20/19 0647 77   10/20/19 0346 68   10/19/19 2311 76   10/19/19 1934 72   10/19/19 1518 73      Patient Vitals for the past 24 hrs:   Resp   10/20/19 1144 18   10/20/19 0647 18   10/20/19 0346 18   10/19/19 2311 16   10/19/19 1934 16   10/19/19 1518 18      Patient Vitals for the past 24 hrs:   Temp   10/20/19 1144 98.1 °F (36.7 °C)   10/20/19 0647 98.3 °F (36.8 °C)   10/20/19 0346 98.1 °F (36.7 °C)   10/19/19 2311 98.3 °F (36.8 °C)   10/19/19 1934 98.3 °F (36.8 °C)   10/19/19 1518 98.2 °F (36.8 °C)        SpO2 Readings from Last 6 Encounters:   10/20/19 98%   10/17/19 94%   10/17/19 96%   10/01/19 98%   03/22/19 99%   01/18/19 97%          O2 Device: Room air Body mass index is 27.04 kg/m².  -  Wt Readings from Last 10 Encounters:   10/18/19 76 kg (167 lb 8.8 oz)   10/18/19 76.7 kg (169 lb)   10/17/19 76 kg (167 lb 8.8 oz)   10/17/19 76.7 kg (169 lb)   10/01/19 76.8 kg (169 lb 6.4 oz)   03/22/19 81.6 kg (179 lb 12.8 oz)   01/18/19 83.5 kg (184 lb)   01/07/19 83.7 kg (184 lb 9.6 oz)   09/27/18 81.6 kg (179 lb 12.8 oz)   05/01/18 81.6 kg (180 lb)        Physical Exam:             General:  Alert, cooperative,   well noursished,   well developed,   appears stated age    Ears/Eyes:  Hearing intact  Sclera anicteric. Pupils equal   Mouth/Throat:     Neck:     Lungs:        CVS:     Abdomen:     Extremities:     Skin:     Lymph nodes:     Musculoskeletal    Neuro    Psych:  Alert and oriented,   normal mood & affect          Medications reviewed     Current Facility-Administered Medications   Medication Dose Route Frequency    LORazepam (ATIVAN) tablet 0.5 mg  0.5 mg Oral TID    ondansetron (ZOFRAN) injection 2 mg  2 mg IntraVENous Q4H PRN    acetaminophen (TYLENOL) tablet 650 mg  650 mg Oral Q4H PRN    Or    acetaminophen (TYLENOL) solution 650 mg  650 mg Per NG tube Q4H PRN    Or    acetaminophen (TYLENOL) suppository 650 mg  650 mg Rectal Q4H PRN    aspirin chewable tablet 81 mg  81 mg Oral DAILY    bisacodyl (DULCOLAX) suppository 10 mg  10 mg Rectal DAILY PRN    enoxaparin (LOVENOX) injection 40 mg  40 mg SubCUTAneous Q24H    LORazepam (ATIVAN) injection 1 mg  1 mg IntraVENous Z0K PRN    folic acid (FOLVITE) tablet 1 mg  1 mg Oral DAILY    thiamine mononitrate (B-1) tablet 100 mg  100 mg Oral DAILY    atorvastatin (LIPITOR) tablet 20 mg  20 mg Oral DAILY    lisinopril (PRINIVIL, ZESTRIL) tablet 20 mg  20 mg Oral DAILY    amLODIPine (NORVASC) tablet 10 mg  10 mg Oral DAILY    therapeutic multivitamin (THERAGRAN) tablet 1 Tab  1 Tab Oral DAILY       Relevant other informations:      Other medical conditions listed in [facility] problem list section; all of these and other pertinent data were taken into consideration when treatment plan is developed and customized to this patient's unique overall circumstances and needs. We have reviewed available old medical records within the constraints of this admission process. Data Review:   Recent Days:   All Micro Results     None          Recent Labs     10/17/19  1907   WBC 5.1   HGB 14.9   HCT 44.7        Recent Labs     10/19/19  0236 10/17/19  1907    138   K 4.0 3.5    103   CO2 25 26   * 88   BUN 14 9   CREA 0.96 0.85   CA 9.1 8.7   MG  --  1.8   ALB  --  3.5   TBILI  --  0.8   SGOT  --  45*   ALT  --  47      Lab Results   Component Value Date/Time    TSH 4.06 (H) 10/18/2019 04:34 AM            Care Plan discussed with:Patient/Family and Nurse   Other medical conditions are listed in the active hospital problem list section; these and other pertinent data were taken into consideration when the treatment plan was developed and customized to this patient's unique overall circumstances and needs. High complexity decision making was performed for this patient who is at high risk for decompensation with multiple organ involvement. Today total floor/unit time was 35 minutes while caring for this patient and greater than 50% of that time was spent with patient (and/or family) coordinating patients clinical issues; this includes time spent during multidisciplinary rounds.         Julissa Do MD MPH FACP    10/20/2019

## 2019-10-20 NOTE — PROGRESS NOTES
Bedside and Verbal shift change report given to carolina (oncoming nurse) by Kaitlin Henderson (offgoing nurse). Report included the following information SBAR and Kardex.     Zone Phone:   [phone]        Significant changes during shift:  none           Patient Information     Mani Norton  74 y.o.  10/17/2019  5:50 PM by Karol Penaloza MD. Mani Norton was admitted from Home     Problem List          Patient Active Problem List     Diagnosis Date Noted    Gait abnormality 10/18/2019    Bilateral carotid artery stenosis 10/18/2019    Vertebrobasilar artery insufficiency 10/18/2019    BMI 29.0-29.9,adult 03/22/2019    Encounter for immunization 01/13/2019    Hx of colonic polyp 04/23/2018    Diverticula of colon 04/23/2018    Status post left hip replacement 12/19/2014    Hypertension      Hypercholesterolemia             Past Medical History:   Diagnosis Date    Gout      Hypercholesterolemia      Hypertension      Hypokalemia              Core Measures:     CVA: No No  CHF:No No  PNA:No No     P  Activity Status:     OOB to Chair Yes  Ambulated this shift Yes   Bed Rest No     Supplemental O2: (If Applicable)     NC No  NRB No  Venti-mask No  On  Liters/min        LINES AND DRAINS:     Central Line? No Placement date  Reason Medically Necessary      PICC LINE? No Placement date Reason Medically Necessary      Urinary Catheter? No Placement Date  Reason Medically Necessary      DVT prophylaxis:     DVT prophylaxis Med- No  DVT prophylaxis SCD or KELLY- No      Wounds: (If Applicable)     Wounds- No     Location      Patient Safety:     Falls Score Total Score: 4  Safety Level_______  Bed Alarm On? No  Sitter? No     Plan for upcoming shift: poss dc           Discharge Plan:  Yes      Active Consults:  IP CONSULT TO HOSPITALIST  IP CONSULT TO NEUROLOGY

## 2019-10-21 VITALS
HEART RATE: 73 BPM | RESPIRATION RATE: 18 BRPM | BODY MASS INDEX: 26.93 KG/M2 | DIASTOLIC BLOOD PRESSURE: 73 MMHG | SYSTOLIC BLOOD PRESSURE: 115 MMHG | HEIGHT: 66 IN | WEIGHT: 167.55 LBS | TEMPERATURE: 98.4 F | OXYGEN SATURATION: 96 %

## 2019-10-21 LAB — RPR SER QL: NONREACTIVE

## 2019-10-21 PROCEDURE — 74011250636 HC RX REV CODE- 250/636: Performed by: INTERNAL MEDICINE

## 2019-10-21 PROCEDURE — 74011250637 HC RX REV CODE- 250/637: Performed by: INTERNAL MEDICINE

## 2019-10-21 PROCEDURE — 74011250637 HC RX REV CODE- 250/637: Performed by: EMERGENCY MEDICINE

## 2019-10-21 RX ORDER — ASPIRIN 325 MG/1
100 TABLET, FILM COATED ORAL DAILY
Qty: 60 TAB | Refills: 3 | Status: SHIPPED | OUTPATIENT
Start: 2019-10-22

## 2019-10-21 RX ORDER — GUAIFENESIN 100 MG/5ML
81 LIQUID (ML) ORAL DAILY
Qty: 90 TAB | Refills: 1 | Status: SHIPPED | OUTPATIENT
Start: 2019-10-22 | End: 2020-04-03 | Stop reason: SDUPTHER

## 2019-10-21 RX ADMIN — ASPIRIN 81 MG 81 MG: 81 TABLET ORAL at 08:35

## 2019-10-21 RX ADMIN — FOLIC ACID 1 MG: 1 TABLET ORAL at 08:34

## 2019-10-21 RX ADMIN — AMLODIPINE BESYLATE 10 MG: 5 TABLET ORAL at 08:34

## 2019-10-21 RX ADMIN — THERA TABS 1 TABLET: TAB at 08:35

## 2019-10-21 RX ADMIN — ATORVASTATIN CALCIUM 20 MG: 20 TABLET, FILM COATED ORAL at 08:34

## 2019-10-21 RX ADMIN — Medication 100 MG: at 08:34

## 2019-10-21 RX ADMIN — LISINOPRIL 20 MG: 20 TABLET ORAL at 08:34

## 2019-10-21 RX ADMIN — ENOXAPARIN SODIUM 40 MG: 40 INJECTION SUBCUTANEOUS at 08:34

## 2019-10-21 NOTE — PROGRESS NOTES
[facility]:    [place] [address]  2nd IM Medicare Letter  Family to transport home    CM: Liz Patient currently working in the [facility]. CM completed room visit with pt, to discuss d/c needs and plans. Pt reported that he will be living alone, and he is interested in [place] [address] services for when he returns home. Pt informed of FOC document (signed) placed in chart. CM sent referral to [address] [facility] [place] pending). Pt will have transport home at d/c, provided by his brother. Pt will require 2nd IM Medicare letter at d/c. CM will continue to follow. UPDATE: 10:25AM    CM informed that [facility] has accepted pt. CM will inform pt and nurse of the following.     Vena Patient, MSW, [address] [place] [address]

## 2019-10-21 NOTE — PROGRESS NOTES
Patient was given discharge instructions, medication regime and follow up appointments. All questions were answered and patient is ready for transportation.

## 2019-10-21 NOTE — PROGRESS NOTES
Bedside and Verbal shift change report given to sara HOLBROOK (oncoming nurse) by raffy HOLBROOK (offgoing nurse).  Report included the following information SBAR and Kardex.     Zone Phone:   [phone]        Significant changes during shift:  none           Patient Information     Gissell Late  74 y.o.  10/17/2019  5:50 PM by Yogesh De La Rosa MD. Kirsh Felton was admitted from Home     Problem List             Patient Active Problem List     Diagnosis Date Noted    Gait abnormality 10/18/2019    Bilateral carotid artery stenosis 10/18/2019    Vertebrobasilar artery insufficiency 10/18/2019    BMI 29.0-29.9,adult 03/22/2019    Encounter for immunization 01/13/2019    Hx of colonic polyp 04/23/2018    Diverticula of colon 04/23/2018    Status post left hip replacement 12/19/2014    Hypertension      Hypercholesterolemia                Past Medical History:   Diagnosis Date    Gout      Hypercholesterolemia      Hypertension      Hypokalemia              Core Measures:     CVA: No No  CHF:No No  PNA:No No     P  Activity Status:     OOB to Chair Yes  Ambulated this shift Yes   Bed Rest No     Supplemental K7: (XF Applicable)     NC No  NRB No  Venti-mask No  On  Liters/min        LINES AND DRAINS:     Central Line? No      PICC LINE? No      Urinary Catheter? No     DVT prophylaxis:     DVT prophylaxis Med- No  DVT prophylaxis SCD or KELLY- No      Wounds: (If Applicable)     Wounds- No     Location      Patient Safety:     Falls Score Total Score: 4  Safety Level_______  Bed Alarm On? No  Sitter? No     Plan for upcoming shift: poss dc           Discharge Plan: Yes      Active Consults:  IP CONSULT TO HOSPITALIST  IP CONSULT TO NEUROLOGY

## 2019-10-21 NOTE — DISCHARGE SUMMARY
Hospitalist Discharge Summary     Patient ID:  Moon Bourne  [id number]  74 y.o.  1945  10/17/2019    PCP on record: Curtis Burleson DO    Admit date: 10/17/2019  Discharge date and time: 10/21/2019    DISCHARGE DIAGNOSIS:    74years old male with a past medical history of heavy alcohol abuse, hypertension, hyperlipidemia who was transferred from [facility] for evaluation of tremors,  gait abnormality and chest pain.     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 Doppler of carotids unremarkable. B12, Vit D WNL. Elevated TSH. .   Ft4, RPR, lipid panel ordered. Discussed case with Neurology. On ASA and statin. PT OT recommended [facility] PT/OT. Patient feeling well today and wants to go home, brother in the room who will take him home      Alcohol use disorder  Alcohol withdrawal  Patient is willing to quit drinking. No sign of withdrawal   Continue thiamine and MVM.      Atypical Chest pain- resolved  Negative serial troponin   Echocardiogram LVEF 51-55%, borderline low normal RV systolic dysfunction. No signs of CHF. Patient asymptomatic and hemodynamically stable. F/u echocardiogram in 6-12 months.     Hypertension  Controlled. Continue home amlodipine and lisinopril     Hyperlipidemia  Lipid panel ordered.  On home Zocor     CONSULTATIONS:  IP CONSULT TO NEUROLOGY    Excerpted HPI from H&P of Martínez Duque MD:    74years old male with past medical history significant for alcohol abuse, hypertension, hyperlipidemia was transferred from [facility] for evaluation of tremor associated with gait abnormality associated with chest pain, patient has a long history of alcohol abuse and he stopped drinking yesterday, patient stated he been complaining from unsteady gait for a long time but since yesterday he been complaining from tremor associated with worsening unsteady gait patient denies any focal weakness or numbness, patient stated he drank about 2-3 drink every day had a CT scan was done and show no acute intracranial process severe bilateral vertebral artery calcification can be associated with vertebrobasilar insufficiency syndrome. ______________________________________________________________________  DISCHARGE SUMMARY/HOSPITAL COURSE:  for full details see H&P, daily progress notes, labs, consult notes. See D/C dx above    _______________________________________________________________________  Patient seen and examined by me on discharge day. Pertinent Findings:  Gen:    Not in distress  Chest: Clear lungs  CVS:   Regular rhythm. No edema  Abd:  Soft, not distended, not tender  Neuro:  Alert, oriented X3  _______________________________________________________________________  DISCHARGE MEDICATIONS:   Current Discharge Medication List      START taking these medications    Details   aspirin 81 mg chewable tablet Take 1 Tab by mouth daily. Qty: 90 Tab, Refills: 1      thiamine mononitrate (B-1) 100 mg tablet Take 1 Tab by mouth daily. Qty: 60 Tab, Refills: 3         CONTINUE these medications which have NOT CHANGED    Details   amLODIPine (NORVASC) 10 mg tablet TAKE ONE TABLET BY MOUTH DAILY FOR BLOOD PRESSURE  Qty: 90 Tab, Refills: 0    Associated Diagnoses: Essential hypertension      simvastatin (ZOCOR) 20 mg tablet TAKE ONE TABLET BY MOUTH NIGHTLY FOR CHOLESTEROL AND HEART  Qty: 90 Tab, Refills: 3    Associated Diagnoses: Hypercholesterolemia      lisinopril (PRINIVIL, ZESTRIL) 20 mg tablet TAKE ONE TABLET BY MOUTH DAILY. Indications: pressure  Qty: 90 Tab, Refills: 3    Associated Diagnoses: Essential hypertension      VIT C/E/ZN/COPPR/LUTEIN/ZEAXAN (PRESERVISION AREDS 2 PO) Take 1 Tab by mouth two (2) times a day. Patient Follow Up Instructions:    Activity: ad libid  Diet: cardiac  Wound Care: yes    Follow-up with neurology Dr Hung Ohara in 4 to 6 weeks call the office to set up the appointment  Follow-up tests/labs   Follow-up Information     Follow up With Specialties Details Why Contact Tamia Farr DO Internal Medicine, Pediatrics   [facility]  [address]  [phone]          ________________________________________________________________    Risk of deterioration: moderate    Condition at Discharge:  Stable  __________________________________________________________________    Disposition  Home with [place]    ____________________________________________________________________    Code Status: full  ___________________________________________________________________      Total time in minutes spent coordinating this discharge (includes going over instructions, follow-up, prescriptions, and preparing report for sign off to her PCP) :  35 minutes    Signed:  José Miguel Lewis MD

## 2019-10-21 NOTE — PROGRESS NOTES
Problem: Falls - Risk of  Goal: *Absence of Falls  Description  Document Remy Banerjee Fall Risk and appropriate interventions in the flowsheet.   Outcome: Progressing Towards Goal  Note:   Fall Risk Interventions:  Mobility Interventions: Bed/chair exit alarm    Mentation Interventions: Bed/chair exit alarm    Medication Interventions: Bed/chair exit alarm, Patient to call before getting OOB    Elimination Interventions: Bed/chair exit alarm, Call light in reach    History of Falls Interventions: Bed/chair exit alarm         Problem: Patient Education: Go to Patient Education Activity  Goal: Patient/Family Education  Outcome: Progressing Towards Goal     Problem: Patient Education: Go to Patient Education Activity  Goal: Patient/Family Education  Outcome: Progressing Towards Goal     Problem: Patient Education: Go to Patient Education Activity  Goal: Patient/Family Education  Outcome: Progressing Towards Goal

## 2019-10-22 ENCOUNTER — PATIENT OUTREACH (OUTPATIENT)
Dept: FAMILY MEDICINE CLINIC | Age: 74
End: 2019-10-22

## 2019-10-22 LAB
IGA SERPL-MCNC: 186 MG/DL (ref 61–437)
IGG SERPL-MCNC: 1076 MG/DL (ref 700–1600)
IGM SERPL-MCNC: 51 MG/DL (ref 15–143)
PROT PATTERN SERPL IFE-IMP: NORMAL

## 2019-10-22 NOTE — PROGRESS NOTES
Current Outpatient Medications   Medication Sig    aspirin 81 mg chewable tablet Take 1 Tab by mouth daily.  thiamine mononitrate (B-1) 100 mg tablet Take 1 Tab by mouth daily.  amLODIPine (NORVASC) 10 mg tablet TAKE ONE TABLET BY MOUTH DAILY FOR BLOOD PRESSURE    simvastatin (ZOCOR) 20 mg tablet TAKE ONE TABLET BY MOUTH NIGHTLY FOR CHOLESTEROL AND HEART    lisinopril (PRINIVIL, ZESTRIL) 20 mg tablet TAKE ONE TABLET BY MOUTH DAILY. Indications: pressure    VIT C/E/ZN/COPPR/LUTEIN/ZEAXAN (PRESERVISION AREDS 2 PO) Take 1 Tab by mouth two (2) times a day. No current facility-administered medications for this visit. There are no discontinued medications.     [facility] follow up appointment(s):   Future Appointments   Date Time Provider Luis Antonio Estrada   10/23/2019  9:10 AM Caity King DO [facility]   11/29/2019 11:00 AM Davion Suh  [facility]

## 2019-10-23 PROBLEM — R94.30 LEFT VENTRICULAR EJECTION FRACTION GREATER THAN 40%: Status: ACTIVE | Noted: 2019-10-23

## 2019-10-23 PROBLEM — F10.21 ALCOHOL USE DISORDER, MODERATE, IN EARLY REMISSION (HCC): Status: ACTIVE | Noted: 2019-10-23

## 2019-11-27 NOTE — PROGRESS NOTES
Neurology Note    Patient ID:  Capo Carmona  [id number]  27 y.o.  1945      Date of Consultation:  November 29, 2019    Referring Physician: Dr. Deanne Glaser    Reason for Consultation:  neuropathy    Subjective: my balance       History of Present Illness:   Capo Carmona is a 66 y.o. male with a history of hypertension and hyperlipidemia who was recently hospitalized approximately 1 month ago for gait difficulties and worsening tremor. He did have a very long-standing history of alcohol use. He was having worsening balance and increasing falls. During the hospitalization he was noted to have a rather severe length dependent sensorimotor neuropathy as well as a action based tremor. He did have an MRI performed which revealed no central etiology for his symptoms. He also did have a carotid Doppler study shows that there is mild carotid stenosis. Since leaving the hospital, his balance is doing better. He hasn't had any alcohol since leaving the hospital.  His brother came to stay with him and help clear out his liquor cabinet. Went completely dry, he does feel that his tremor is also doing much better. He is trying to be more active and is taking his medications as regularly as prescribed. He has not had any falls. He still does have a shower sign. His only concern is that he is urinary more frequency. He states that he has noticed this since leaving the hospital that he needs to urinate every 2 hours. It is not painful when he urinates. He has not spoken to his primary care doctor about this.     Past Medical History:   Diagnosis Date    Gout     Hypercholesterolemia     Hypertension     Hypokalemia         Past Surgical History:   Procedure Laterality Date    COLONOSCOPY N/A 4/23/2018    COLONOSCOPY performed by Sonja Simmons MD at [address], COLON, DIAGNOSTIC      HX COLONOSCOPY  2011    HX COLONOSCOPY  2018    polyps    HX CYST REMOVAL      rt foot x2  HX HEENT      bilat cataract repair    HX ORTHOPAEDIC      left ankle fx  rt ankle fx    HX ORTHOPAEDIC      L hip repair        Family History   Problem Relation Age of Onset    Diabetes Mother     Dementia Mother     Heart Failure Father     Obesity Brother     Hypertension Brother         Social History     Tobacco Use    Smoking status: Never Smoker    Smokeless tobacco: Never Used   Substance Use Topics    Alcohol use: Yes     Alcohol/week: 12.5 - 15.0 standard drinks     Types: 15 - 18 Standard drinks or equivalent per week     Comment: mariely        No Known Allergies     Prior to Admission medications    Medication Sig Start Date End Date Taking? Authorizing Provider   amLODIPine (NORVASC) 5 mg tablet Take 1 Tab by mouth daily. 11/6/19  Yes Lotus King DO   aspirin 81 mg chewable tablet Take 1 Tab by mouth daily. 10/22/19  Yes Anahi Good MD   thiamine mononitrate (B-1) 100 mg tablet Take 1 Tab by mouth daily. 10/22/19  Yes Anahi Good MD   simvastatin (ZOCOR) 20 mg tablet TAKE ONE TABLET BY MOUTH NIGHTLY FOR CHOLESTEROL AND HEART 5/3/19  Yes Lotus King DO   lisinopril (PRINIVIL, ZESTRIL) 20 mg tablet TAKE ONE TABLET BY MOUTH DAILY. Indications: pressure 3/22/19  Yes Lotus King DO   VIT C/E/ZN/COPPR/LUTEIN/ZEAXAN (PRESERVISION AREDS 2 PO) Take 1 Tab by mouth two (2) times a day. Yes Provider, Historical       Review of Systems:    General, constitutional: negative  Eyes, vision: negative  Ears, nose, throat: negative  Cardiovascular, heart: negative  Respiratory: negative  Gastrointestinal: negative  Genitourinary: increased urination.    Musculoskeletal: negative  Skin and integumentary: negative  Psychiatric: negative  Endocrine: negative  Neurological: negative, except for HPI  Hematologic/lymphatic: negative  Allergy/immunology: negative    Objective:     Visit Vitals  /70   Pulse 79   Ht 5' 6\" (1.676 m)   Wt 172 lb (78 kg)   SpO2 98%   BMI 27.76 kg/m²       Physical Exam:  General:  appears well nourished in no acute distress  Neck: no carotid bruits  Lungs: clear to auscultation  Heart:  no murmurs, regular rate  Lower extremity: peripheral pulses palpable and no edema  Skin: intact    Neurological exam:    Awake, alert, oriented to person, place and time  Recent and remote memory were normal  Attention and concentration were intact  Language was intact. There was no aphasia  Speech: no dysarthria  Fund of knowledge was preserved    Cranial nerves:   II-XII were tested    Perrrla  Fundoscopic examination revealed venous pulsations and no clear abnormalities  Visual fields were full  Eomi, no evidence of nystagmus  Facial sensation:  normal and symmetric  Facial motor: normal and symmetric  Hearing intact  SCM strength intact  Tongue: midline without fasciculations    Motor: Tone normal    No evidence of fasciculations    Strength testing:   deltoid triceps biceps Wrist ext. Wrist flex. intrinsics Hip flex. Hip ext. Knee ext. Knee flex Dorsi flex Plantar flex   Right 5 5 5 5 5 4 5 5 5 5 5 5   Left 5 5 5 5 5 4 5 5 5 5 5 5         Sensory:  Upper extremity: intact to pp, light touch, and vibration > 10 seconds  Lower extremity: Pinprick was decreased to the level of his knees bilaterally. Vibration was absent at his toes and his ankles. It was present for 5 seconds at his knees bilaterally    Reflexes:    Right Left  Biceps  2 2  Triceps 2 2  Brachiorad. 2 2  Patella  - -  Achilles - -    Plantar response:  flexor bilaterally    Cerebellar testing: There was no resting or action tremor today. Romberg:  Present. Gait: Wide-based gait.  He was unable to tandem gait    Labs:     Lab Results   Component Value Date/Time    Hemoglobin A1c 5.5 10/01/2019 11:18 AM    Sodium 140 10/19/2019 02:36 AM    Potassium 4.0 10/19/2019 02:36 AM    Chloride 108 10/19/2019 02:36 AM    Glucose 112 (H) 10/19/2019 02:36 AM    BUN 14 10/19/2019 02:36 AM Creatinine 0.96 10/19/2019 02:36 AM    Calcium 9.1 10/19/2019 02:36 AM    WBC 5.1 10/17/2019 07:07 PM    HCT 44.7 10/17/2019 07:07 PM    HGB 14.9 10/17/2019 07:07 PM    PLATELET 808 75/20/0827 07:07 PM       Imaging:    Results from [place] encounter on 10/17/19   MRI BRAIN WO CONT    Narrative INDICATION: Abnormal gait, alcohol withdrawal.    Exam: Multiplanar noncontrast MRI of the brain is performed with T1, T2,  gradient, FLAIR and diffusion sequences. Direct comparison is made to prior CT dated October 17, 2019. FINDINGS: There is diffuse cortical atrophy. There is no restricted diffusion to  suggest acute infarct. The ventricular system is normal. There is a 4 mm focus  of susceptibility within the right parietal white matter; there is a probable  peripheral hemosiderin ring. The gray-white matter differentiation is otherwise  well-preserved. The cervicomedullary junction is normal and there is no  tonsillar ectopia. The visualized vascular flow-voids of the skull base are  normal. There is no herniation. Impression IMPRESSION:   1. No acute infarct. 2. 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 Results from [place] encounter on 10/17/19   CT HEAD WO CONT    Narrative EXAM: CT HEAD WO CONT    INDICATION: unsteady gait    COMPARISON: None. CONTRAST: None. TECHNIQUE: Unenhanced CT of the head was performed using 5 mm images. Brain and  bone windows were generated. CT dose reduction was achieved through use of a  standardized protocol tailored for this examination and automatic exposure  control for dose modulation. FINDINGS:  The ventricles and sulci are normal in size, shape and configuration and  midline. There is no significant white matter disease. There is no intracranial  hemorrhage, extra-axial collection, mass, mass effect or midline shift. The  basilar cisterns are open. No acute infarct is identified.  The bone windows  demonstrate no abnormalities. The visualized portions of the paranasal sinuses  and mastoid air cells are clear. Severe bilateral vertebral artery  calcification. Impression IMPRESSION:    No acute intracranial process seen. Severe bilateral vertebral artery calcification can be associated with  vertebrobasilar insufficiency syndrome. Assessment and Plan: This is a pleasant 77-year-old gentleman  With multiple medical problems which impacts his neurological health who presents for a hospital follow up. His neurological examination is notable for a rather severe distal length dependent sensorimotor neuropathy. 1. Severe peripheral neuropathy:     His heavy alcohol use is most likely a contributing factor. Serology looking for reversible causes of a dementia while hospitalized were unremarkable. He should receive an EMG nerve conduction study to determine if this is primarily demyelinating versus axonal.  I will order this. Neuropathy:  we reviewed the causes contributing to the neuropathy. We discussed the importance of exercise and activity. I also reviewed the importance of safety with ambulation and ways to prevents falls. 2.  Tremor while hospitalized which was  predominantly action based and slightly asymmetric:  Examination has improved considerably. Not present today. Most likely related to his etoh use and withdrawal.  MRI revealed no structural abnormality  Will continue to follow clinically.       3. Risk factors for stroke:  He does have multiple risk factors for stroke including hypertension and high cholesterol:  Continue aspirin daily. Continue aggressive blood pressure control. Continue statin therapy      4. Carotid stenosis:  He has a 50 to 69% stenosis on the left. Continue asa  Will need to have a follow up carotid doppler study in late 2020      5. Heavy alcohol use:  He has recently went alcohol free. I commended him on his lifestyle change.    Stressed the importance of remaining free of etoh.      6.  Increasing urinary frequency:  I asked him to follow-up with his primary care doctor in regards to this concern as I do not see a specific neurological etiology for this today.       Patient Active Problem List   Diagnosis Code    Hypertension I10    Hypercholesterolemia E78.00    Status post left hip replacement Z96.642    Hx of colonic polyp Z86.010    Diverticula of colon K57.30    Encounter for immunization Z23    BMI 27.0-27.9,adult Z68.27    Gait abnormality R26.9    Bilateral carotid artery stenosis I65.23    Vertebrobasilar artery insufficiency G45.0    Alcohol use disorder, moderate, in early remission ([address] F10.21    Left ventricular ejection fraction greater than 40% R94.30                   Signed By:  DO ROMI RaeN    November 29, 2019

## 2019-11-29 ENCOUNTER — OFFICE VISIT (OUTPATIENT)
Dept: NEUROLOGY | Age: 74
End: 2019-11-29

## 2019-11-29 VITALS
DIASTOLIC BLOOD PRESSURE: 70 MMHG | WEIGHT: 172 LBS | HEART RATE: 79 BPM | BODY MASS INDEX: 27.64 KG/M2 | HEIGHT: 66 IN | SYSTOLIC BLOOD PRESSURE: 130 MMHG | OXYGEN SATURATION: 98 %

## 2019-11-29 DIAGNOSIS — G62.9 NEUROPATHY: Primary | ICD-10-CM

## 2019-11-29 NOTE — PATIENT INSTRUCTIONS
Office Policies 
o Phone calls/patient messages: Please allow up to 24 hours for someone in the office to contact you about your call or message. Be mindful your provider may be out of the office or your message may require further review. We encourage you to use [employer] for your messages as this is a faster, more efficient way to communicate with our office 
o Medication Refills: 
Prescription medications require up to 48 business hours to process. We encourage you to use [employer] for your refills. For controlled medications: Please allow up to 72 business hours to process. Certain medications may require you to  a written prescription at our office. NO narcotic/controlled medications will be prescribed after 4pm Monday through Friday or on weekends 
o Form/Paperwork Completion: We ask that you allow 7-14 business days. You may also download your forms to [facility] to have your doctor print off. A Healthy Lifestyle: Care Instructions Your Care Instructions A healthy lifestyle can help you feel good, stay at a healthy weight, and have plenty of energy for both work and play. A healthy lifestyle is something you can share with your whole family. A healthy lifestyle also can lower your risk for serious health problems, such as high blood pressure, heart disease, and diabetes. You can follow a few steps listed below to improve your health and the health of your family. Follow-up care is a key part of your treatment and safety. Be sure to make and go to all appointments, and call your doctor if you are having problems. It's also a good idea to know your test results and keep a list of the medicines you take. How can you care for yourself at home? · Do not eat too much sugar, fat, or fast foods. You can still have dessert and treats now and then. The goal is moderation. · Start small to improve your eating habits.  Pay attention to portion sizes, drink less juice and soda pop, and eat more fruits and vegetables. ? Eat a healthy amount of food. A 3-ounce serving of meat, for example, is about the size of a deck of cards. Fill the rest of your plate with vegetables and whole grains. ? Limit the amount of soda and sports drinks you have every day. Drink more water when you are thirsty. ? Eat at least 5 servings of fruits and vegetables every day. It may seem like a lot, but it is not hard to reach this goal. A serving or helping is 1 piece of fruit, 1 cup of vegetables, or 2 cups of leafy, raw vegetables. Have an apple or some carrot sticks as an afternoon snack instead of a candy bar. Try to have fruits and/or vegetables at every meal. 
· Make exercise part of your daily routine. You may want to start with simple activities, such as walking, bicycling, or slow swimming. Try to be active 30 to 60 minutes every day. You do not need to do all 30 to 60 minutes all at once. For example, you can exercise 3 times a day for 10 or 20 minutes. Moderate exercise is safe for most people, but it is always a good idea to talk to your doctor before starting an exercise program. 
· Keep moving. [place] Brod[place] the lawn, work in the garden, or [employer]. Take the stairs instead of the elevator at work. · If you smoke, quit. People who smoke have an increased risk for heart attack, stroke, cancer, and other lung illnesses. Quitting is hard, but there are ways to boost your chance of quitting tobacco for good. ? Use nicotine gum, patches, or lozenges. ? Ask your doctor about stop-smoking programs and medicines. ? Keep trying. In addition to reducing your risk of diseases in the future, you will notice some benefits soon after you stop using tobacco. If you have shortness of breath or asthma symptoms, they will likely get better within a few weeks after you quit. · Limit how much alcohol you drink.  Moderate amounts of alcohol (up to 2 drinks a day for men, 1 drink a day for women) are okay. But drinking too much can lead to liver problems, high blood pressure, and other health problems. Family health If you have a family, there are many things you can do together to improve your health. · Eat meals together as a family as often as possible. · Eat healthy foods. This includes fruits, vegetables, lean meats and dairy, and whole grains. · Include your family in your fitness plan. Most people think of activities such as jogging or tennis as the way to fitness, but there are many ways you and your family can be more active. Anything that makes you breathe hard and gets your heart pumping is exercise. Here are some tips: 
? Walk to do errands or to take your child to school or the bus. 
? Go for a family bike ride after dinner instead of watching TV. Where can you learn more? Go to http://tawannaAutoSpotjeromy.info/. Enter R636 in the search box to learn more about \"A Healthy Lifestyle: Care Instructions. \" Current as of: May 28, 2019 Content Version: 12.2 © 5528-6676 [employer]. Care instructions adapted under license by [employer] (which disclaims liability or warranty for this information). If you have questions about a medical condition or this instruction, always ask your healthcare professional. [address] any warranty or liability for your use of this information. Learning About Peripheral Neuropathy What is peripheral neuropathy? Peripheral neuropathy is a problem that affects the peripheral nerves. These nerves lead from the spinal cord to other parts of the body. They control your sense of touch, how you feel pain and temperature, and your muscle strength. Most of the time the problem starts in the fingers and toes. As it gets worse, it moves into the limbs. It can cause pain and loss of feeling in the feet, legs, and hands. What causes it? There are several causes: · Diabetes. This is the most common cause. If your blood sugar is too high for too long, it can damage the nerves. · Kidney problems. These can lead to toxic substances in the blood that damage nerves. · Low levels of thyroid hormone (hypothyroidism). This may cause swelling of the tissues around the nerves, which can put pressure on them. · Infectious or inflammatory diseases. Examples are HIV and Guillain-Barré syndrome. These diseases can damage the nerves. · Peripheral nerve injury. A physical injury can damage the nerves. Injuries can be from things like falls, car crashes, or playing sports. · Overusing alcohol and not eating a healthy diet. These can lead to your body not having enough of certain vitamins, such as vitamin B-12. This can damage nerves. · Being exposed to toxic substances. These include lead, mercury, and certain medicines, such as those used for chemotherapy. Sometimes the cause isn't known. What are the symptoms? Symptoms of peripheral neuropathy can occur slowly over time. The most common ones are: · Numbness, tightness, and tingling, especially in the legs, hands, and feet. · Loss of feeling. · Burning, shooting, or stabbing pain in the legs, hands, and feet. Often the pain is worse at night. · Weakness and loss of balance. What can happen if you have it? If peripheral neuropathy gets worse, it can lead to a complete lack of feeling in your hands or feet. This can make you more likely to injure them. It may lead to calluses and blisters. It can also lead to bone and joint problems, infection, and ulcers. For instance, small, repeated injuries to the foot may lead to bigger problems. This can happen because you can't feel the injuries. Reduced feeling in the feet can also change your step, leading to bone or joint problems.  
If untreated, foot problems can become so severe that the foot or lower leg may have to be amputated. But treatment can slow down peripheral neuropathy. And it's a good idea to take care to avoid injury. How is it diagnosed? To diagnose peripheral neuropathy, your doctor will ask you about: 
· Your symptoms. · Your medical history. This may include your use of alcohol, risk of HIV infection, or exposure to toxic substances. · Your family's medical history, including nerve disease. Your doctor may test how well you can feel touch, temperature, and pain. You may also have blood tests. These tests will help the doctor find out if you have conditions that can cause neuropathy. Examples are diabetes, vitamin deficiencies, thyroid disease, and kidney problems. How is it treated? Treatment for peripheral neuropathy can relieve symptoms. This is done by treating the health problem that's causing it. For example, if you have diabetes, keeping your blood sugar within your target range may help. Or maybe your body lacks certain vitamins caused by drinking too much alcohol. In that case, treatment may include eating a healthy diet, taking vitamins, and stopping alcohol use. You may have physical therapy. This can increase muscle strength and help build muscle control. Over-the-counter medicine can relieve mild nerve pain. Your doctor may also prescribe medicine to help with severe pain, numbness, tingling, and weakness. If you have neuropathy in your feet, it's a good idea to have them checked during each office visit. This can help prevent problems. Some people find that physical therapy, acupuncture, or transcutaneous electrical nerve stimulation (TENS) helps relieve pain. Follow-up care is a key part of your treatment and safety. Be sure to make and go to all appointments, and call your doctor if you are having problems. It's also a good idea to know your test results and keep a list of the medicines you take. Where can you learn more? Go to http://tawanna-jeromy.info/. Enter P130 in the search box to learn more about \"Learning About Peripheral Neuropathy. \" Current as of: April 16, 2019 Content Version: 12.2 © 1811-1388 [employer]. Care instructions adapted under license by [employer] (which disclaims liability or warranty for this information). If you have questions about a medical condition or this instruction, always ask your healthcare professional. [place] [address] any warranty or liability for your use of this information.

## 2020-01-17 ENCOUNTER — OFFICE VISIT (OUTPATIENT)
Dept: NEUROLOGY | Age: 75
End: 2020-01-17

## 2020-01-17 VITALS
HEART RATE: 71 BPM | BODY MASS INDEX: 27.64 KG/M2 | HEIGHT: 66 IN | DIASTOLIC BLOOD PRESSURE: 72 MMHG | OXYGEN SATURATION: 97 % | SYSTOLIC BLOOD PRESSURE: 131 MMHG | WEIGHT: 172 LBS

## 2020-01-17 DIAGNOSIS — G62.1 ALCOHOL-INDUCED POLYNEUROPATHY (HCC): ICD-10-CM

## 2020-01-17 NOTE — PROCEDURES
ELECTRODIAGNOSTIC REPORT      Test Date:  2020    Patient: Augusta Goodell : 1945 Physician: Dr. Sary Larios   ID#: [id number] SEX: Male Ref. Phys: Dr. Sary Larios     Patient History / Exam:  Pleasant gentleman with sensory disturbance and pain in his lower extremity. Examination reveals a length dependent neuropathy. EMG & NCV Findings:    Nerve conduction studies as listed below were absent in the left sural sensory and superficial fibular sensory. The left ulnar sensory was also absent. The left median sensory revealed a mild prolongation of the peak latency with a reduced amplitude. The left radial sensory revealed a normal peak latency and amplitude    The left fibular motor study when recording from the EDB was absent. The left fibular motor study when recording from the tibialis anterior revealed a normal amplitude and mild slowing of the conduction velocity. The left median motor study was normal.  The left tibial motor study showed a reduced amplitude proximally. The left ulnar motor study revealed a reduced amplitude throughout with mild slowing across the elbow. Disposable concentric needle examination of the muscles listed below in the left lower extremity were normal except for a mild decrease recruitment in the left tibialis anterior    Impression: This study was abnormal.  There was electrodiagnostic evidence upon today's examination suggestin. 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 2.  A possible mild left ulnar neuropathy across the elbow. He is asymptomatic with this. 3.  There was no evidence of a myopathy or lumbar radiculopathy. ___________________________  Sary ALVA  FAAN    Nerve Conduction Studies  Anti Sensory Summary Table     Stim Site NR Onset (ms) Peak (ms) O-P Amp (µV) Norm Peak (ms) Norm O-P Amp Site1 Site2 Dist (cm) Norm Jhonatan (m/s)   Left Median Anti Sensory (2nd Digit)  30.4°C   Wrist    3.9 4.6 6.5 <4 >11 Wrist 2nd Digit 14.0    Left Radial Anti Sensory (Base 1st Digit)  28.4°C   Wrist    1.9 2.6 21.2 <2.8 7 Wrist Base 1st Digit 10.0    Left Sup Fibular Anti Sensory (Lat ankle)  29.2°C   Lower leg NR    <4.4 >5.0 Lower leg Lat ankle 10.0 >32   Left Sural Anti Sensory (Lat Mall)  27.5°C   Calf NR    <4.5 >4.0 Calf Lat Mall 14.0    Left Ulnar Anti Sensory (5th Digit)  28.4°C   Wrist NR    <4.0 >10 Wrist 5th Digit 14.0      Motor Summary Table     Stim Site NR Onset (ms) Norm Onset (ms) O-P Amp (mV) Norm O-P Amp P-T Amp (mV) Site1 Site2 Dist (cm) Jhonatan (m/s)   Left Fibular Motor (Ext Dig Brev)  30.6°C   Ankle NR  <6.5  >1.1  Ankle Ext Dig Brev 8.0    B Fib NR      B Fib Ankle 0.0    Poplt NR      Poplt B Fib 10.0    Left Fibular TA Motor (Tib Ant)  30.7°C   Fib Head    2.0 <4.5 3.3 >3.0  Fib Head Tib Ant 10.0 50   Poplit    4.8  2.8   Poplit Fib Head 10.0 36   Left Median Motor (Abd Poll Brev)  28.1°C   Wrist    4.2 <4.5 4.2 >4.1  Wrist Abd Poll Brev 8.0 19   Elbow    8.9  4.0   Elbow Wrist 23.0 49   Left Tibial Motor (Abd Lockett Brev)  31.1°C   Ankle    4.1 <6.1 1.7 >1.1  Ankle Abd Lockett Brev 8.0 20   Knee    13.0  1.0   Knee Ankle 39.0 44   Left Ulnar Motor (Abd Dig Minimi)  27.9°C   Wrist    3.1 <3.1 3.2 >7.0  Wrist Abd Dig Minimi 8.0 26   B Elbow    7.3  2.9   B Elbow Wrist 23.0 55   A Elbow    9.8  2.9   A Elbow B Elbow 10.0 40     Comparison Summary Table     Stim Site NR Peak (ms) P-T Amp (µV) Site1 Site2 Dist (cm) Delta-P (ms)   Left Median/Ulnar Palm Comparison (Wrist)  28.3°C   Median Palm    2.5 39.1 Median Palm Ulnar Palm 8.0 0.3   Ulnar Palm    2.2 19.9           EMG     Side Muscle Nerve Root Ins Act Fibs Psw Recrt Duration Amp Poly Comment   Left AntTibialis Dp Br Peron L4-5 Nml Nml Nml Reduced Nml Nml Nml    Left MedGastroc Tibial S1-2 Nml Nml Nml Nml Nml Nml Nml    Left VastusLat Femoral L2-4 Nml Nml Nml Nml Nml Nml Nml    Left Semitendinosus Sciatic L5-S2 Nml Nml Nml Nml Nml Nml Nml    Left Fibularis Long Sup Br Emilia L5-S1 Nml Nml Nml Nml Nml Nml Nml      Waveforms:

## 2020-03-02 DIAGNOSIS — I10 ESSENTIAL HYPERTENSION: ICD-10-CM

## 2020-03-02 RX ORDER — LISINOPRIL 20 MG/1
TABLET ORAL
Qty: 90 TAB | Refills: 0 | Status: SHIPPED | OUTPATIENT
Start: 2020-03-02 | End: 2020-04-03 | Stop reason: SDUPTHER

## 2020-05-27 NOTE — PROGRESS NOTES
Neurology Note    Patient ID:  Ketty Dodd  [id number]  20 y.o.  1945      Date of Consultation:  May 28, 2020    Referring Physician: Dr. Antonio Raymond    Reason for Consultation:  neuropathy    Subjective: my balance is getting better. History of Present Illness:   Ketty Dodd is a 66 y.o. male with a history of hypertension and hyperlipidemia who was hospitalized in October 2019 for gait difficulties and worsening tremor. He had a very longstanding history of significant alcohol use. He ultimately was discharged from the hospital and I did see him in clinic for the first time on November 29, 2019. Please see my history of present illness, examination, and treatment based plan from that time. It did appear that he had had a neuropathy causing his balance difficulties. He was then scheduled for an EMG/nerve conduction study which revealed that he had a length dependent primarily axonal sensorimotor neuropathy. There is also a possible mild superimposed left ulnar neuropathy across the elbow. There is no evidence of a myopathy or lumbar radiculopathy. Since that time, the patient reports that his sensory disturbance and balance is improving. He has not had any falls. He notices occasional sensory disturbance but not to the point that he needs medication. He also feels that the tremor is doing better. He is living independently and is able to perform all of his activities of daily living without any challenges. He had quit alcohol completely but is now having a glass of wine every 3 to 4 days. This is very little in regards to how much he was previously drinking. He has lost about 15 pounds due to just his overall change in health.     He has been back to see his primary care doctor and his optometrist and both visits were reportedly good  Past Medical History:   Diagnosis Date    Gout     Hypercholesterolemia     Hypertension     Hypokalemia         Past Surgical History: Procedure Laterality Date    COLONOSCOPY N/A 4/23/2018    COLONOSCOPY performed by Malika Matos MD at [address], COLON, DIAGNOSTIC      HX COLONOSCOPY  2011    HX COLONOSCOPY  2018    polyps    HX CYST REMOVAL      rt foot x2    HX HEENT      bilat cataract repair    HX ORTHOPAEDIC      left ankle fx  rt ankle fx    HX ORTHOPAEDIC      L hip repair        Family History   Problem Relation Age of Onset    Diabetes Mother     Dementia Mother     Heart Failure Father     Obesity Brother     Hypertension Brother         Social History     Tobacco Use    Smoking status: Never Smoker    Smokeless tobacco: Never Used   Substance Use Topics    Alcohol use: Yes     Alcohol/week: 12.5 - 15.0 standard drinks     Types: 15 - 18 Standard drinks or equivalent per week     Comment: mariely        No Known Allergies     Prior to Admission medications    Medication Sig Start Date End Date Taking? Authorizing Provider   lisinopriL (PRINIVIL, ZESTRIL) 20 mg tablet Take 1 Tab by mouth daily. 4/7/20  Yes Good, Lotus R, DO   amLODIPine (NORVASC) 5 mg tablet Take 1 Tab by mouth daily. 4/7/20  Yes Good, Lotus R, DO   simvastatin (ZOCOR) 20 mg tablet Take 1 Tab by mouth nightly. 4/7/20  Yes Good, Lotus R, DO   aspirin 81 mg chewable tablet Take 1 Tab by mouth daily. 4/3/20  Yes Good, Lotus R, DO   thiamine mononitrate (B-1) 100 mg tablet Take 1 Tab by mouth daily. 10/22/19  Yes Radha Valles MD   VIT C/E/ZN/COPPR/LUTEIN/ZEAXAN (PRESERVISION AREDS 2 PO) Take 1 Tab by mouth two (2) times a day. Yes Provider, Historical       Review of Systems:    General, constitutional: negative  Eyes, vision: negative  Ears, nose, throat: negative  Cardiovascular, heart: negative  Respiratory: negative  Gastrointestinal: negative  Genitourinary: increased urination.    Musculoskeletal: negative  Skin and integumentary: negative  Psychiatric: negative  Endocrine: negative  Neurological: negative, except for HPI  Hematologic/lymphatic: negative  Allergy/immunology: negative    Objective:     Visit Vitals  /70   Pulse 72   Temp 97 °F (36.1 °C)   Ht 5' 6\" (1.676 m)   SpO2 98%   BMI 27.76 kg/m²       Physical Exam:  General:  appears well nourished in no acute distress  Neck: no carotid bruits  Lungs: clear to auscultation  Heart:  no murmurs, regular rate  Lower extremity: peripheral pulses palpable and no edema  Skin: intact    Neurological exam:    Awake, alert, oriented to person, place and time  Recent and remote memory were normal  Attention and concentration were intact  Language was intact. There was no aphasia  Speech: no dysarthria  Fund of knowledge was preserved    Cranial nerves:   II-XII were tested    Perrrla  Visual fields were full  Eomi, no evidence of nystagmus  Facial sensation:  normal and symmetric  Facial motor: normal and symmetric  Hearing intact  SCM strength intact  Tongue: midline without fasciculations    Motor: Tone normal    No evidence of fasciculations    Strength testing:   deltoid triceps biceps Wrist ext. Wrist flex. intrinsics Hip flex. Hip ext. Knee ext. Knee flex Dorsi flex Plantar flex   Right 5 5 5 5 5 4 5 5 5 5 5 5   Left 5 5 5 5 5 4 5 5 5 5 5 5         Sensory:  Upper extremity: intact to pp, light touch, and vibration > 10 seconds  Lower extremity: Pinprick was decreased to the level of mid shins today. Vibration was present for 2 seconds in his toes and 5 seconds at his ankles. This is an improvement since last visit. Reflexes:    Right Left  Biceps  2 2  Triceps 2 2  Brachiorad. 2 2  Patella  - -  Achilles - -    Plantar response:  flexor bilaterally    Cerebellar testing: There was no resting or action tremor today. Romberg:  Present. Gait: Wide-based gait.  He was unable to tandem gait    Labs:     Lab Results   Component Value Date/Time    Hemoglobin A1c 5.5 10/01/2019 11:18 AM    Sodium 140 10/19/2019 02:36 AM    Potassium 4.0 10/19/2019 02:36 AM    Chloride 108 10/19/2019 02:36 AM    Glucose 112 (H) 10/19/2019 02:36 AM    BUN 14 10/19/2019 02:36 AM    Creatinine 0.96 10/19/2019 02:36 AM    Calcium 9.1 10/19/2019 02:36 AM    WBC 5.1 10/17/2019 07:07 PM    HCT 44.7 10/17/2019 07:07 PM    HGB 14.9 10/17/2019 07:07 PM    PLATELET 907 93/59/2155 07:07 PM       Imaging:    Results from [place] encounter on 10/17/19   MRI BRAIN WO CONT    Narrative INDICATION: Abnormal gait, alcohol withdrawal.    Exam: Multiplanar noncontrast MRI of the brain is performed with T1, T2,  gradient, FLAIR and diffusion sequences. Direct comparison is made to prior CT dated October 17, 2019. FINDINGS: There is diffuse cortical atrophy. There is no restricted diffusion to  suggest acute infarct. The ventricular system is normal. There is a 4 mm focus  of susceptibility within the right parietal white matter; there is a probable  peripheral hemosiderin ring. The gray-white matter differentiation is otherwise  well-preserved. The cervicomedullary junction is normal and there is no  tonsillar ectopia. The visualized vascular flow-voids of the skull base are  normal. There is no herniation. Impression IMPRESSION:   1. No acute infarct. 2. 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 Results from [place] encounter on 10/17/19   CT HEAD WO CONT    Narrative EXAM: CT HEAD WO CONT    INDICATION: unsteady gait    COMPARISON: None. CONTRAST: None. TECHNIQUE: Unenhanced CT of the head was performed using 5 mm images. Brain and  bone windows were generated. CT dose reduction was achieved through use of a  standardized protocol tailored for this examination and automatic exposure  control for dose modulation. FINDINGS:  The ventricles and sulci are normal in size, shape and configuration and  midline. There is no significant white matter disease.  There is no intracranial  hemorrhage, extra-axial collection, mass, mass effect or midline shift. The  basilar cisterns are open. No acute infarct is identified. The bone windows  demonstrate no abnormalities. The visualized portions of the paranasal sinuses  and mastoid air cells are clear. Severe bilateral vertebral artery  calcification. Impression IMPRESSION:    No acute intracranial process seen. Severe bilateral vertebral artery calcification can be associated with  vertebrobasilar insufficiency syndrome. Assessment and Plan: This is a pleasant 73-year-old gentleman  With multiple medical problems which impacts his neurological health who presents for a hospital follow up. His neurological examination is notable for a rather severe distal length dependent sensorimotor neuropathy. His examination, however, has improved since last visit    1. Severe peripheral neuropathy:     emg revealed a primarily axonal process. His heavy alcohol use is most likely a contributing factor. Serology looking for reversible causes of a dementia while hospitalized were unremarkable. With a change in lifestyle, his neuropathy symptoms have improved to some degree. Neuropathy:  we reviewed the causes contributing to the neuropathy. We discussed the importance of exercise and activity. I also reviewed the importance of safety with ambulation and ways to prevents falls. 2.  Tremor while hospitalized which was  predominantly action based and slightly asymmetric:  Examination has improved considerably. Not present again today. Most likely related to his etoh use and withdrawal.  MRI revealed no structural abnormality  Will continue to follow clinically.       3. Risk factors for stroke:  He does have multiple risk factors for stroke including hypertension and high cholesterol:  Continue aspirin daily. Continue aggressive blood pressure control. Continue statin therapy      4. Carotid stenosis:  He has a 50 to 69% stenosis on the left.   Continue asa  Will need to have a follow up carotid doppler study in late 2020. This has been scheduled for October. I will see him at that time. 5.  Heavy alcohol use:  He has recently went alcohol free, but then did add in a glass of wine every 3 to 4 days. .  I commended him on his lifestyle change. Stressed the importance of remaining free of etoh and the positive impact this has had on his overall health.         Patient Active Problem List   Diagnosis Code    Hypertension I10    Hypercholesterolemia E78.00    Status post left hip replacement Z96.642    Hx of colonic polyp Z86.010    Diverticula of colon K57.30    Encounter for immunization Z23    BMI 27.0-27.9,adult Z68.27    Gait abnormality R26.9    Bilateral carotid artery stenosis I65.23    Vertebrobasilar artery insufficiency G45.0    Alcohol use disorder, moderate, in early remission ([facility] [address] F10.21    Left ventricular ejection fraction greater than 40% R94.30               The patient should return to clinic in 6 months    Renewed medication: none. Ordered doppler study    I spent   25  minutes with the patient  with over 50 % of the time counseling and coordinating the care plan in regards to the diagnosis, diagnostic testing, and treatment plan. The patient had the ability to ask questions and all questions were answered.          Signed By:  Marisabel Guillen DO FAAN    May 28, 2020

## 2020-05-28 ENCOUNTER — OFFICE VISIT (OUTPATIENT)
Dept: NEUROLOGY | Age: 75
End: 2020-05-28

## 2020-05-28 VITALS
SYSTOLIC BLOOD PRESSURE: 148 MMHG | BODY MASS INDEX: 27.76 KG/M2 | HEART RATE: 72 BPM | DIASTOLIC BLOOD PRESSURE: 70 MMHG | TEMPERATURE: 97 F | HEIGHT: 66 IN | OXYGEN SATURATION: 98 %

## 2020-05-28 DIAGNOSIS — I65.23 BILATERAL CAROTID ARTERY STENOSIS: Primary | ICD-10-CM

## 2021-06-21 NOTE — PROGRESS NOTES
Neurology Note    Patient ID:  Maurizio Carrillo  [id number]  78 y.o.  1945      Date of Consultation:  June 22, 2021      Subjective: my balance continues to get better. History of Present Illness:   Maurizio Carrillo is a 66 y.o. male who returns to the neurology clinic at [facility].  He was last seen in the clinic just over 1 year ago on May 28, 2020. Please see my history of present illness, examination, and treatment base plan from that day. 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 Since that time, the patient reports that his sensory disturbance and balance continues to be stable. He has not had any falls. His tremor is much better. He states he has cut down significantly on his alcohol and has a beer approximately once a day now. He did receive his Covid vaccine. He states that he went back to see his primary care doctor and his laboratory results were good. Past Medical History:   Diagnosis Date    Gout     Hypercholesterolemia     Hypertension     Hypokalemia         Past Surgical History:   Procedure Laterality Date    COLONOSCOPY N/A 4/23/2018    COLONOSCOPY performed by Nicole Iverson MD at [address] [facility], COLON, DIAGNOSTIC      HX COLONOSCOPY  2011    HX COLONOSCOPY  2018    polyps    HX CYST REMOVAL      rt foot x2    HX HEENT      bilat cataract repair    HX ORTHOPAEDIC      left ankle fx  rt ankle fx    HX ORTHOPAEDIC      L hip repair        Family History   Problem Relation Age of Onset    Diabetes Mother     Dementia Mother     Heart Failure Father     Obesity Brother     Hypertension Brother         Social History     Tobacco Use    Smoking status: Never Smoker    Smokeless tobacco: Never Used   Substance Use Topics    Alcohol use:  Yes     Alcohol/week: 12.5 - 15.0 standard drinks     Types: 15 - 18 Standard drinks or equivalent per week     Comment: mariely        No Known Allergies     Prior to Admission medications    Medication Sig Start Date End Date Taking? Authorizing Provider   simvastatin (ZOCOR) 20 mg tablet TAKE 1 TABLET BY MOUTH NIGHTLY 5/7/21  Yes Lotus King R, DO   amLODIPine (NORVASC) 5 mg tablet TAKE 1 TABLET BY MOUTH ONCE DAILY 4/26/21  Yes Lotus Kign R, DO   lisinopriL (PRINIVIL, ZESTRIL) 20 mg tablet Take 1 Tab by mouth daily. 10/16/20  Yes Lotus King R, DO   aspirin 81 mg chewable tablet Take 1 Tab by mouth daily. 4/3/20  Yes Lotus King R, DO   thiamine mononitrate (B-1) 100 mg tablet Take 1 Tab by mouth daily. 10/22/19  Yes Shayna Early MD   VIT C/E/ZN/COPPR/LUTEIN/ZEAXAN (PRESERVISION AREDS 2 PO) Take 1 Tab by mouth two (2) times a day. Yes Provider, Historical       Review of Systems:    General, constitutional: negative  Eyes, vision: negative  Ears, nose, throat: negative  Cardiovascular, heart: negative  Respiratory: negative  Gastrointestinal: negative  Genitourinary: increased urination. Musculoskeletal: negative  Skin and integumentary: negative  Psychiatric: negative  Endocrine: negative  Neurological: negative, except for HPI  Hematologic/lymphatic: negative  Allergy/immunology: negative    Objective:     Visit Vitals  /72 (BP 1 Location: Right arm, BP Patient Position: Sitting, BP Cuff Size: Adult)   Pulse 78   Temp 98 °F (36.7 °C)   Resp 16   SpO2 95%       Physical Exam:  General:  appears well nourished in no acute distress  Neck: no carotid bruits  Lungs: clear to auscultation  Heart:  no murmurs, regular rate  Lower extremity: peripheral pulses palpable and no edema  Skin: intact    Neurological exam:    Awake, alert, oriented to person, place and time  Recent and remote memory were normal  Attention and concentration were intact  Language was intact.   There was no aphasia  Speech: no dysarthria  Fund of knowledge was preserved    Cranial nerves:   II-XII were tested    Perrrla  Visual fields were full  Eomi, no evidence of nystagmus  Facial sensation:  normal and symmetric  Facial motor: normal and symmetric  Hearing intact  SCM strength intact  Tongue: midline without fasciculations    Motor: Tone normal    No evidence of fasciculations    Strength testing:   deltoid triceps biceps Wrist ext. Wrist flex. intrinsics Hip flex. Hip ext. Knee ext. Knee flex Dorsi flex Plantar flex   Right 5 5 5 5 5 4 5 5 5 5 5 5   Left 5 5 5 5 5 4 5 5 5 5 5 5         Sensory:  Upper extremity: intact to pp, light touch, and vibration > 10 seconds  Lower extremity: Pinprick was decreased to the level of mid shins today. Vibration was present for 2 seconds in his toes and 5 seconds at his ankles. Unchanged since last visit      Reflexes:    Right Left  Biceps  2 2  Triceps 2 2  Brachiorad. 2 2  Patella  - -  Achilles - -    Plantar response:  flexor bilaterally    Cerebellar testing: There was no resting or action tremor today. Romberg:  Present. Gait: Wide-based gait. He was unable to tandem gait. unchanged    Labs:     Lab Results   Component Value Date/Time    Hemoglobin A1c 5.5 10/01/2019 11:18 AM    Sodium 140 10/19/2019 02:36 AM    Potassium 4.0 10/19/2019 02:36 AM    Chloride 108 10/19/2019 02:36 AM    Glucose 112 (H) 10/19/2019 02:36 AM    BUN 14 10/19/2019 02:36 AM    Creatinine 0.96 10/19/2019 02:36 AM    Calcium 9.1 10/19/2019 02:36 AM    WBC 5.1 10/17/2019 07:07 PM    HCT 44.7 10/17/2019 07:07 PM    HGB 14.9 10/17/2019 07:07 PM    PLATELET 004 06/49/6425 07:07 PM       Imaging:    Results from [place] encounter on 10/17/19    MRI BRAIN WO CONT    Narrative  INDICATION: Abnormal gait, alcohol withdrawal.    Exam: Multiplanar noncontrast MRI of the brain is performed with T1, T2,  gradient, FLAIR and diffusion sequences. Direct comparison is made to prior CT dated October 17, 2019.     FINDINGS: There is diffuse cortical atrophy. There is no restricted diffusion to  suggest acute infarct. The ventricular system is normal. There is a 4 mm focus  of susceptibility within the right parietal white matter; there is a probable  peripheral hemosiderin ring. The gray-white matter differentiation is otherwise  well-preserved. The cervicomedullary junction is normal and there is no  tonsillar ectopia. The visualized vascular flow-voids of the skull base are  normal. There is no herniation. Impression  IMPRESSION:  1. No acute infarct. 2. 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 Results from [place] encounter on 10/17/19    CT HEAD WO CONT    Narrative  EXAM: CT HEAD WO CONT    INDICATION: unsteady gait    COMPARISON: None. CONTRAST: None. TECHNIQUE: Unenhanced CT of the head was performed using 5 mm images. Brain and  bone windows were generated. CT dose reduction was achieved through use of a  standardized protocol tailored for this examination and automatic exposure  control for dose modulation. FINDINGS:  The ventricles and sulci are normal in size, shape and configuration and  midline. There is no significant white matter disease. There is no intracranial  hemorrhage, extra-axial collection, mass, mass effect or midline shift. The  basilar cisterns are open. No acute infarct is identified. The bone windows  demonstrate no abnormalities. The visualized portions of the paranasal sinuses  and mastoid air cells are clear. Severe bilateral vertebral artery  calcification. Impression  IMPRESSION:    No acute intracranial process seen. Severe bilateral vertebral artery calcification can be associated with  vertebrobasilar insufficiency syndrome. Assessment and Plan: This is a pleasant 49-year-old gentleman with multiple medical problems which impacts his neurological health who returns to clinic for his peripheral neuropathy, prior tremor, vascular disease.   His neurological examination is notable for a rather severe distal length dependent sensorimotor neuropathy. His examination, however, has improved since initial.      1. Severe peripheral neuropathy:  emg revealed a primarily axonal process. His heavy alcohol use is most likely a contributing factor. Serology looking for reversible causes of a dementia while hospitalized were unremarkable. With a change in lifestyle, his neuropathy symptoms have improved to some degree. Neuropathy:  we reviewed the causes contributing to the neuropathy. We discussed the importance of exercise and activity. I also reviewed the importance of safety with ambulation and ways to prevents falls. 2.  Tremor while hospitalized which was  predominantly action based and slightly asymmetric:  Examination has improved considerably. Not present again today. Most likely related to his etoh use and withdrawal.  MRI revealed no structural abnormality  Will continue to follow clinically.       3. Risk factors for stroke:  He does have multiple risk factors for stroke including hypertension and high cholesterol:  Continue aspirin daily. Continue aggressive blood pressure control. Continue statin therapy      4. Carotid stenosis:  He has a 50 to 69% stenosis on the left. Continue asa  I have ordered his follow up doppler study      5. Prior Heavy alcohol use:  He had went alcohol free, but now has a beer a day  I commended him on his lifestyle change.    Stressed the importance of remaining free of etoh and the positive impact this has had on his overall health.         Patient Active Problem List   Diagnosis Code    Hypertension I10    Hypercholesterolemia E78.00    Status post left hip replacement Z96.642    Hx of colonic polyp Z86.010    Diverticula of colon K57.30    Encounter for immunization Z23    BMI 27.0-27.9,adult Z68.27    Gait abnormality R26.9    Bilateral carotid artery stenosis I65.23    Vertebrobasilar artery insufficiency G45.0    Alcohol use disorder, moderate, in early remission ([facility] [address] F10.21    Left ventricular ejection fraction greater than 40% R94.30               The patient should return to clinic in one year    Renewed medication: none. I spent  30  minutes on the day of the encounter preparing the office visit by reviewing medical records, obtaining a history, performing examination, counseling and educating the patient on diagnosis, ordering  tests, documenting in the clinical medical record, and coordinating the care for the patient. The patient had the ability to ask questions and all questions were answered.         Signed By:  Monika Brand DO FAAN    June 22, 2021

## 2021-06-22 ENCOUNTER — OFFICE VISIT (OUTPATIENT)
Dept: NEUROLOGY | Age: 76
End: 2021-06-22
Payer: MEDICARE

## 2021-06-22 VITALS
SYSTOLIC BLOOD PRESSURE: 130 MMHG | HEART RATE: 78 BPM | OXYGEN SATURATION: 95 % | TEMPERATURE: 98 F | DIASTOLIC BLOOD PRESSURE: 72 MMHG | RESPIRATION RATE: 16 BRPM

## 2021-06-22 DIAGNOSIS — I65.23 BILATERAL CAROTID ARTERY STENOSIS: Primary | ICD-10-CM

## 2021-06-22 DIAGNOSIS — G62.1 ALCOHOL-INDUCED POLYNEUROPATHY (HCC): ICD-10-CM

## 2021-06-22 PROCEDURE — G8432 DEP SCR NOT DOC, RNG: HCPCS | Performed by: PSYCHIATRY & NEUROLOGY

## 2021-06-22 PROCEDURE — G8752 SYS BP LESS 140: HCPCS | Performed by: PSYCHIATRY & NEUROLOGY

## 2021-06-22 PROCEDURE — G8754 DIAS BP LESS 90: HCPCS | Performed by: PSYCHIATRY & NEUROLOGY

## 2021-06-22 PROCEDURE — G8419 CALC BMI OUT NRM PARAM NOF/U: HCPCS | Performed by: PSYCHIATRY & NEUROLOGY

## 2021-06-22 PROCEDURE — G8536 NO DOC ELDER MAL SCRN: HCPCS | Performed by: PSYCHIATRY & NEUROLOGY

## 2021-06-22 PROCEDURE — G8427 DOCREV CUR MEDS BY ELIG CLIN: HCPCS | Performed by: PSYCHIATRY & NEUROLOGY

## 2021-06-22 PROCEDURE — 1101F PT FALLS ASSESS-DOCD LE1/YR: CPT | Performed by: PSYCHIATRY & NEUROLOGY

## 2021-06-22 PROCEDURE — 99214 OFFICE O/P EST MOD 30 MIN: CPT | Performed by: PSYCHIATRY & NEUROLOGY

## 2021-09-13 ENCOUNTER — OFFICE VISIT (OUTPATIENT)
Dept: FAMILY MEDICINE CLINIC | Age: 76
End: 2021-09-13
Payer: MEDICARE

## 2021-09-13 VITALS
SYSTOLIC BLOOD PRESSURE: 112 MMHG | TEMPERATURE: 98.6 F | WEIGHT: 154 LBS | RESPIRATION RATE: 14 BRPM | DIASTOLIC BLOOD PRESSURE: 70 MMHG | BODY MASS INDEX: 24.75 KG/M2 | OXYGEN SATURATION: 97 % | HEART RATE: 53 BPM | HEIGHT: 66 IN

## 2021-09-13 DIAGNOSIS — R35.1 NOCTURIA: ICD-10-CM

## 2021-09-13 DIAGNOSIS — R79.9 ABNORMAL FINDING OF BLOOD CHEMISTRY, UNSPECIFIED: ICD-10-CM

## 2021-09-13 DIAGNOSIS — Z13.29 SCREENING FOR THYROID DISORDER: ICD-10-CM

## 2021-09-13 DIAGNOSIS — Z00.00 MEDICARE ANNUAL WELLNESS VISIT, SUBSEQUENT: ICD-10-CM

## 2021-09-13 DIAGNOSIS — F10.21 ALCOHOL USE DISORDER, MODERATE, IN EARLY REMISSION (HCC): ICD-10-CM

## 2021-09-13 DIAGNOSIS — R73.02 IMPAIRED GLUCOSE TOLERANCE: Primary | ICD-10-CM

## 2021-09-13 DIAGNOSIS — Z13.39 SCREENING FOR ALCOHOLISM: ICD-10-CM

## 2021-09-13 DIAGNOSIS — E03.9 ACQUIRED HYPOTHYROIDISM: ICD-10-CM

## 2021-09-13 DIAGNOSIS — Z23 ENCOUNTER FOR IMMUNIZATION: ICD-10-CM

## 2021-09-13 DIAGNOSIS — M35.3 PMR (POLYMYALGIA RHEUMATICA) (HCC): ICD-10-CM

## 2021-09-13 DIAGNOSIS — R79.89 ELEVATED TSH: ICD-10-CM

## 2021-09-13 DIAGNOSIS — Z87.898 HISTORY OF ELEVATED PROSTATE SPECIFIC ANTIGEN (PSA): ICD-10-CM

## 2021-09-13 PROCEDURE — 36415 COLL VENOUS BLD VENIPUNCTURE: CPT | Performed by: NURSE PRACTITIONER

## 2021-09-13 PROCEDURE — 90694 VACC AIIV4 NO PRSRV 0.5ML IM: CPT | Performed by: NURSE PRACTITIONER

## 2021-09-13 PROCEDURE — G0008 ADMIN INFLUENZA VIRUS VAC: HCPCS | Performed by: NURSE PRACTITIONER

## 2021-09-13 PROCEDURE — G0439 PPPS, SUBSEQ VISIT: HCPCS | Performed by: NURSE PRACTITIONER

## 2021-09-13 NOTE — PATIENT INSTRUCTIONS
Medicare Wellness Visit, Male    The best way to live healthy is to have a lifestyle where you eat a well-balanced diet, exercise regularly, limit alcohol use, and quit all forms of tobacco/nicotine, if applicable. Regular preventive services are another way to keep healthy. Preventive services (vaccines, screening tests, monitoring & exams) can help personalize your care plan, which helps you manage your own care. Screening tests can find health problems at the earliest stages, when they are easiest to treat. [facility] follows the current, evidence-based guidelines published by the [facility] ([facility]) when recommending preventive services for our patients. Because we follow these guidelines, sometimes recommendations change over time as research supports it. (For example, a prostate screening blood test is no longer routinely recommended for men with no symptoms). Of course, you and your doctor may decide to screen more often for some diseases, based on your risk and co-morbidities (chronic disease you are already diagnosed with). Preventive services for you include:  - Medicare offers their members a free annual wellness visit, which is time for you and your primary care provider to discuss and plan for your preventive service needs. Take advantage of this benefit every year!  -All adults over age 72 should receive the recommended pneumonia vaccines. Current USPSTF guidelines recommend a series of two vaccines for the best pneumonia protection.   -All adults should have a flu vaccine yearly and tetanus vaccine every 10 years.  -All adults age 48 and older should receive the shingles vaccines (series of two vaccines).        -All adults age 38-68 who are overweight should have a diabetes screening test once every three years.   -Other screening tests & preventive services for persons with diabetes include: an eye exam to screen for diabetic retinopathy, a kidney function test, a foot exam, and stricter control over your cholesterol.   -Cardiovascular screening for adults with routine risk involves an electrocardiogram (ECG) at intervals determined by the provider.   -Colorectal cancer screening should be done for adults age 54-65 with no increased risk factors for colorectal cancer. There are a number of acceptable methods of screening for this type of cancer. Each test has its own benefits and drawbacks. Discuss with your provider what is most appropriate for you during your annual wellness visit. The different tests include: colonoscopy (considered the best screening method), a fecal occult blood test, a fecal DNA test, and sigmoidoscopy.  -All adults born between [facility] should be screened once for Hepatitis C.  -An Abdominal Aortic Aneurysm (AAA) Screening is recommended for men age 73-68 who has ever smoked in their lifetime.      Here is a list of your current Health Maintenance items (your personalized list of preventive services) with a due date:  Health Maintenance Due   Topic Date Due    Hepatitis C Test  Never done    COVID-19 Vaccine (1) Never done    DTaP/Tdap/Td  (1 - Tdap) Never done    Cholesterol Test   10/19/2020    Yearly Flu Vaccine (1) 09/01/2021

## 2021-09-13 NOTE — PROGRESS NOTES
1. Have you been to the ER, urgent care clinic since your last visit? Hospitalized since your last visit? No    2. Have you seen or consulted any other health care providers outside of the [address] [place] [address] since your last visit? Include any pap smears or colon screening.  No      Chief Complaint   Patient presents with   [facility] Annual Wellness Visit         Visit Vitals  /70 (BP 1 Location: Left upper arm, BP Patient Position: Sitting, BP Cuff Size: Adult)   Pulse (!) 53   Temp 98.6 °F (37 °C) (Temporal)   Resp 14   Ht 5' 6\" (1.676 m)   Wt 154 lb (69.9 kg)   SpO2 97%   BMI 24.86 kg/m²       Pain Scale: 0 - No pain/10  Pain Location:

## 2021-09-13 NOTE — PROGRESS NOTES
This is the Subsequent Medicare Annual Wellness Exam, performed 12 months or more after the Initial AWV or the last Subsequent AWV    I have reviewed the patient's medical history in detail and updated the computerized patient record. Assessment/Plan   Education and counseling provided:  Are appropriate based on today's review and evaluation    1. Impaired glucose tolerance  -     HEMOGLOBIN A1C WITH EAG; Future  2. Medicare annual wellness visit, subsequent  -     MO ANNUAL ALCOHOL SCREEN 15 MIN  -     CBC WITH AUTOMATED DIFF; Future  -     LIPID PANEL; Future  -     METABOLIC PANEL, COMPREHENSIVE; Future  -     COLLECTION VENOUS BLOOD,VENIPUNCTURE; Future  3. Screening for alcoholism  -     MO ANNUAL ALCOHOL SCREEN 15 MIN  -     CBC WITH AUTOMATED DIFF; Future  -     LIPID PANEL; Future  -     METABOLIC PANEL, COMPREHENSIVE; Future  -     COLLECTION VENOUS BLOOD,VENIPUNCTURE; Future  4. Encounter for immunization  -     ADMIN INFLUENZA VIRUS VAC  -     INFLUENZA VIRUS VACCINE, HIGH DOSE SEASONAL, PRESERVATIVE FREE  5. PMR (polymyalgia rheumatica) (HCC)  -     CBC WITH AUTOMATED DIFF; Future  -     LIPID PANEL; Future  -     METABOLIC PANEL, COMPREHENSIVE; Future  -     COLLECTION VENOUS BLOOD,VENIPUNCTURE; Future  6. Alcohol use disorder, moderate, in early remission ([facility] [address]  -     MO ANNUAL ALCOHOL SCREEN 15 MIN  -     CBC WITH AUTOMATED DIFF; Future  -     LIPID PANEL; Future  -     METABOLIC PANEL, COMPREHENSIVE; Future  -     COLLECTION VENOUS BLOOD,VENIPUNCTURE; Future  7. History of elevated prostate specific antigen (PSA)  -     COLLECTION VENOUS BLOOD,VENIPUNCTURE; Future  -     PSA, DIAGNOSTIC (PROSTATE SPECIFIC AG); Future  8. Abnormal finding of blood chemistry, unspecified   -     LIPID PANEL; Future  9. Nocturia   -     PSA, DIAGNOSTIC (PROSTATE SPECIFIC AG); Future  10. Elevated TSH  11. Screening for thyroid disorder  -     TSH 3RD GENERATION; Future  12.  Acquired hypothyroidism   -     TSH 3RD GENERATION; Future       Depression Risk Factor Screening     3 most recent PHQ Screens 9/13/2021   PHQ Not Done -   Little interest or pleasure in doing things Not at all   Feeling down, depressed, irritable, or hopeless Not at all   Total Score PHQ 2 0       Alcohol Risk Screen    Do you average more than 1 drink per night or more than 7 drinks a week: No    In the past three months have you have had more than 4 drinks containing alcohol on one occasion: No        Functional Ability and Level of Safety    Hearing: Hearing is good. Activities of Daily Living: The home contains: handrails  Patient does total self care      Ambulation: with no difficulty     Fall Risk:  Fall Risk Assessment, last 12 mths 9/13/2021   Able to walk? Yes   Fall in past 12 months? 0   Do you feel unsteady?  0   Are you worried about falling 0      Abuse Screen:  Patient is not abused       Cognitive Screening    Has your family/caregiver stated any concerns about your memory: no     Cognitive Screening: Normal - MMSE (Mini Mental Status Exam)    Health Maintenance Due     Health Maintenance Due   Topic Date Due    Hepatitis C Screening  Never done    COVID-19 Vaccine (1) Never done    DTaP/Tdap/Td series (1 - Tdap) Never done    Lipid Screen  10/19/2020    Flu Vaccine (1) 09/01/2021       Patient Care Team   Patient Care Team:  Arnel Turcios DO as PCP - General (Internal Medicine)  Arnel Turcios DO as PCP - [facility]  Nell Mace MD (Surgery)    History     Patient Active Problem List   Diagnosis Code    Hypertension I10    Hypercholesterolemia E78.00    Status post left hip replacement Z96.642    Hx of colonic polyp Z86.010    Diverticula of colon K57.30    Encounter for immunization Z23    BMI 27.0-27.9,adult Z68.27    Gait abnormality R26.9    Bilateral carotid artery stenosis I65.23    Vertebrobasilar artery insufficiency G45.0    Alcohol use disorder, moderate, in early remission ([facility] F10.21    Left ventricular ejection fraction greater than 40% R94.30     Past Medical History:   Diagnosis Date    Gout     Hypercholesterolemia     Hypertension     Hypokalemia       Past Surgical History:   Procedure Laterality Date    COLONOSCOPY N/A 4/23/2018    COLONOSCOPY performed by Krista Knight MD at [address], COLON, DIAGNOSTIC      HX COLONOSCOPY  2011    HX COLONOSCOPY  2018    polyps    HX CYST REMOVAL      rt foot x2    HX HEENT      bilat cataract repair    HX ORTHOPAEDIC      left ankle fx  rt ankle fx    HX ORTHOPAEDIC      L hip repair     Current Outpatient Medications   Medication Sig Dispense Refill    amLODIPine (NORVASC) 5 mg tablet TAKE 1 TABLET BY MOUTH ONCE DAILY 90 Tablet 0    simvastatin (ZOCOR) 20 mg tablet TAKE 1 TABLET BY MOUTH NIGHTLY 90 Tab 1    lisinopriL (PRINIVIL, ZESTRIL) 20 mg tablet Take 1 Tab by mouth daily. 90 Tab 1    aspirin 81 mg chewable tablet Take 1 Tab by mouth daily. 90 Tab 1    thiamine mononitrate (B-1) 100 mg tablet Take 1 Tab by mouth daily. 60 Tab 3    VIT C/E/ZN/COPPR/LUTEIN/ZEAXAN (PRESERVISION AREDS 2 PO) Take 1 Tab by mouth two (2) times a day. No Known Allergies    Family History   Problem Relation Age of Onset    Diabetes Mother     Dementia Mother     Heart Failure Father     Obesity Brother     Hypertension Brother      Social History     Tobacco Use    Smoking status: Never Smoker    Smokeless tobacco: Never Used   Substance Use Topics    Alcohol use:  Yes     Alcohol/week: 12.5 - 15.0 standard drinks     Types: 15 - 18 Standard drinks or equivalent per week     Comment: mariely Patel NP-C

## 2021-09-14 LAB
ALBUMIN SERPL-MCNC: 4.4 G/DL (ref 3.5–5)
ALBUMIN/GLOB SERPL: 1.5 {RATIO} (ref 1.1–2.2)
ALP SERPL-CCNC: 76 U/L (ref 45–117)
ALT SERPL-CCNC: 30 U/L (ref 12–78)
ANION GAP SERPL CALC-SCNC: 7 MMOL/L (ref 5–15)
AST SERPL-CCNC: 23 U/L (ref 15–37)
BASOPHILS # BLD: 0 K/UL (ref 0–0.1)
BASOPHILS NFR BLD: 0 % (ref 0–1)
BILIRUB SERPL-MCNC: 0.9 MG/DL (ref 0.2–1)
BUN SERPL-MCNC: 11 MG/DL (ref 6–20)
BUN/CREAT SERPL: 14 (ref 12–20)
CALCIUM SERPL-MCNC: 9.6 MG/DL (ref 8.5–10.1)
CHLORIDE SERPL-SCNC: 104 MMOL/L (ref 97–108)
CHOLEST SERPL-MCNC: 210 MG/DL
CO2 SERPL-SCNC: 29 MMOL/L (ref 21–32)
CREAT SERPL-MCNC: 0.78 MG/DL (ref 0.7–1.3)
DIFFERENTIAL METHOD BLD: NORMAL
EOSINOPHIL # BLD: 0.1 K/UL (ref 0–0.4)
EOSINOPHIL NFR BLD: 1 % (ref 0–7)
ERYTHROCYTE [DISTWIDTH] IN BLOOD BY AUTOMATED COUNT: 13.2 % (ref 11.5–14.5)
EST. AVERAGE GLUCOSE BLD GHB EST-MCNC: 120 MG/DL
GLOBULIN SER CALC-MCNC: 3 G/DL (ref 2–4)
GLUCOSE SERPL-MCNC: 87 MG/DL (ref 65–100)
HBA1C MFR BLD: 5.8 % (ref 4–5.6)
HCT VFR BLD AUTO: 50.1 % (ref 36.6–50.3)
HDLC SERPL-MCNC: 57 MG/DL
HDLC SERPL: 3.7 {RATIO} (ref 0–5)
HGB BLD-MCNC: 15.1 G/DL (ref 12.1–17)
IMM GRANULOCYTES # BLD AUTO: 0 K/UL (ref 0–0.04)
IMM GRANULOCYTES NFR BLD AUTO: 0 % (ref 0–0.5)
LDLC SERPL CALC-MCNC: 108 MG/DL (ref 0–100)
LYMPHOCYTES # BLD: 1.7 K/UL (ref 0.8–3.5)
LYMPHOCYTES NFR BLD: 29 % (ref 12–49)
MCH RBC QN AUTO: 29.7 PG (ref 26–34)
MCHC RBC AUTO-ENTMCNC: 30.1 G/DL (ref 30–36.5)
MCV RBC AUTO: 98.6 FL (ref 80–99)
MONOCYTES # BLD: 0.5 K/UL (ref 0–1)
MONOCYTES NFR BLD: 9 % (ref 5–13)
NEUTS SEG # BLD: 3.6 K/UL (ref 1.8–8)
NEUTS SEG NFR BLD: 61 % (ref 32–75)
NRBC # BLD: 0 K/UL (ref 0–0.01)
NRBC BLD-RTO: 0 PER 100 WBC
PLATELET # BLD AUTO: 231 K/UL (ref 150–400)
PMV BLD AUTO: 9.7 FL (ref 8.9–12.9)
POTASSIUM SERPL-SCNC: 4.8 MMOL/L (ref 3.5–5.1)
PROT SERPL-MCNC: 7.4 G/DL (ref 6.4–8.2)
PSA SERPL-MCNC: 2 NG/ML (ref 0.01–4)
RBC # BLD AUTO: 5.08 M/UL (ref 4.1–5.7)
SODIUM SERPL-SCNC: 140 MMOL/L (ref 136–145)
TRIGL SERPL-MCNC: 225 MG/DL (ref ?–150)
TSH SERPL DL<=0.05 MIU/L-ACNC: 1.99 UIU/ML (ref 0.36–3.74)
VLDLC SERPL CALC-MCNC: 45 MG/DL
WBC # BLD AUTO: 5.9 K/UL (ref 4.1–11.1)

## 2021-09-18 NOTE — PROGRESS NOTES
Prediabetes HGBA1C 5.8 . Heart healthy diet   Thyroid level within normal limits   PSA prostate level down to 2.0 from 4.6  CBC no anemia  Cholesterol level a little high. Encourage a heart healthy diet.

## 2021-09-19 NOTE — ACP (ADVANCE CARE PLANNING)
Discussed importance of advanced medical directives with patient. Patient is capable of making decisions.  Milagro Denise NP-MK

## 2021-09-21 ENCOUNTER — TELEPHONE (OUTPATIENT)
Dept: FAMILY MEDICINE CLINIC | Age: 76
End: 2021-09-21

## 2021-09-21 NOTE — TELEPHONE ENCOUNTER
----- Message from Adair Mc sent at 9/21/2021  8:54 AM EDT -----  Regarding: NP benitez/telephone  Contact: [phone]  Patient return call    Caller's first and last name and relationship (if not the patient):n/a      Best contact number(s):[phone]      Whose call is being returned:Betsy      Details to clarify the request:      Adair Mc

## 2021-09-23 ENCOUNTER — TELEPHONE (OUTPATIENT)
Dept: FAMILY MEDICINE CLINIC | Age: 76
End: 2021-09-23

## 2021-11-09 DIAGNOSIS — I10 ESSENTIAL HYPERTENSION: ICD-10-CM

## 2021-11-09 DIAGNOSIS — E78.00 HYPERCHOLESTEROLEMIA: ICD-10-CM

## 2021-11-09 RX ORDER — SIMVASTATIN 20 MG/1
20 TABLET, FILM COATED ORAL
Qty: 90 TABLET | Refills: 1 | Status: SHIPPED | OUTPATIENT
Start: 2021-11-09 | End: 2022-05-09

## 2021-11-09 RX ORDER — LISINOPRIL 20 MG/1
20 TABLET ORAL DAILY
Qty: 90 TABLET | Refills: 1 | Status: SHIPPED | OUTPATIENT
Start: 2021-11-09 | End: 2022-09-12 | Stop reason: SDUPTHER

## 2022-02-21 DIAGNOSIS — I10 ESSENTIAL HYPERTENSION: ICD-10-CM

## 2022-02-21 RX ORDER — AMLODIPINE BESYLATE 5 MG/1
5 TABLET ORAL DAILY
Qty: 90 TABLET | Refills: 0 | Status: SHIPPED | OUTPATIENT
Start: 2022-02-21 | End: 2022-08-26

## 2022-03-18 PROBLEM — Z23 ENCOUNTER FOR IMMUNIZATION: Status: ACTIVE | Noted: 2019-01-13

## 2022-03-19 PROBLEM — K57.30 DIVERTICULA OF COLON: Status: ACTIVE | Noted: 2018-04-23

## 2022-03-19 PROBLEM — R94.30 LEFT VENTRICULAR EJECTION FRACTION GREATER THAN 40%: Status: ACTIVE | Noted: 2019-10-23

## 2022-03-19 PROBLEM — I65.23 BILATERAL CAROTID ARTERY STENOSIS: Status: ACTIVE | Noted: 2019-10-18

## 2022-03-19 PROBLEM — Z86.010 HX OF COLONIC POLYP: Status: ACTIVE | Noted: 2018-04-23

## 2022-03-19 PROBLEM — Z86.0100 HX OF COLONIC POLYP: Status: ACTIVE | Noted: 2018-04-23

## 2022-03-19 PROBLEM — R26.9 GAIT ABNORMALITY: Status: ACTIVE | Noted: 2019-10-18

## 2022-03-19 PROBLEM — F10.21 ALCOHOL USE DISORDER, MODERATE, IN EARLY REMISSION (HCC): Status: ACTIVE | Noted: 2019-10-23

## 2022-03-20 PROBLEM — G45.0 VERTEBROBASILAR ARTERY INSUFFICIENCY: Status: ACTIVE | Noted: 2019-10-18

## 2022-05-09 DIAGNOSIS — E78.00 HYPERCHOLESTEROLEMIA: ICD-10-CM

## 2022-05-09 RX ORDER — SIMVASTATIN 20 MG/1
TABLET, FILM COATED ORAL
Qty: 90 TABLET | Refills: 1 | Status: SHIPPED | OUTPATIENT
Start: 2022-05-09 | End: 2022-09-12 | Stop reason: SDUPTHER

## 2022-06-21 NOTE — PROGRESS NOTES
Neurology Note    Patient ID:  Ishaan Covarrubias  [id number]  35 y.o.  1945      Date of Consultation:  June 22, 2022    Subjective: my still have balance issues     History of Present Illness:   Ishaan Covarrubias is a 64 y.o. male who returns to the neurology clinic at [facility].  He was last seen in the clinic just over 1 year ago on June 29, 2021. Please see my history of present illness, examination, and treatment base plan from that day. 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 Since that time, the patient reports that his sensory disturbance and balance continues to be presents, but he is cognizant of his limitations. He has not had any falls. His tremor is much better. There has been no new medical conditions that have arisen. He does follow closely with his primary care doctor. He denies any new numbness, tingling, or weakness. He is very cautious when he is walking outside or getting on his boat. He still does drink alcohol. He is drinking much less than previously but is averaging between 1-2 beers a day.     Past Medical History:   Diagnosis Date    Gout     Hypercholesterolemia     Hypertension     Hypokalemia         Past Surgical History:   Procedure Laterality Date    COLONOSCOPY N/A 4/23/2018    COLONOSCOPY performed by Catracho Miranda MD at [address], COLON, DIAGNOSTIC      HX COLONOSCOPY  2011    HX COLONOSCOPY  2018    polyps    HX COLONOSCOPY  08/06/2021    HX CYST REMOVAL      rt foot x2    HX HEENT      bilat cataract repair    HX ORTHOPAEDIC      left ankle fx  rt ankle fx    HX ORTHOPAEDIC      L hip repair        Family History   Problem Relation Age of Onset    Diabetes Mother     Dementia Mother     Heart Failure Father     Obesity Brother     Hypertension Brother         Social History     Tobacco Use    Smoking status: Never Smoker    Smokeless tobacco: Never Used   Substance Use Topics    Alcohol use: Yes     Alcohol/week: 12.5 - 15.0 standard drinks     Types: 15 - 18 Standard drinks or equivalent per week     Comment: mariely        No Known Allergies     Prior to Admission medications    Medication Sig Start Date End Date Taking? Authorizing Provider   simvastatin (ZOCOR) 20 mg tablet TAKE 1 TABLET BY MOUTH AT BEDTIME 5/9/22  Yes Emerita Garcia NP   amLODIPine (NORVASC) 5 mg tablet Take 1 Tablet by mouth daily. 2/21/22  Yes Emerita Garcia NP   lisinopriL (PRINIVIL, ZESTRIL) 20 mg tablet Take 1 Tablet by mouth daily. 11/9/21  Yes Emerita Garcia NP   aspirin 81 mg chewable tablet Take 1 Tab by mouth daily. 4/3/20  Yes Lotus King DO   thiamine mononitrate (B-1) 100 mg tablet Take 1 Tab by mouth daily. 10/22/19  Yes Paul Ramirez MD   VIT C/E/ZN/COPPR/LUTEIN/ZEAXAN (PRESERVISION AREDS 2 PO) Take 1 Tab by mouth two (2) times a day. Yes Provider, Historical       Review of Systems:    General, constitutional: negative  Eyes, vision: negative  Ears, nose, throat: negative  Cardiovascular, heart: negative  Respiratory: negative  Gastrointestinal: negative  Genitourinary: increased urination.    Musculoskeletal: negative  Skin and integumentary: negative  Psychiatric: negative  Endocrine: negative  Neurological: negative, except for HPI  Hematologic/lymphatic: negative  Allergy/immunology: negative    Objective:     Visit Vitals  /60 (BP 1 Location: Left arm, BP Patient Position: Sitting, BP Cuff Size: Adult)   Pulse 64   Resp 16   Ht 5' 7\" (1.702 m)   Wt 158 lb 9.6 oz (71.9 kg)   SpO2 97%   BMI 24.84 kg/m²       Physical Exam:  General:  appears well nourished in no acute distress  Neck: no carotid bruits  Lungs: clear to auscultation  Heart:  no murmurs, regular rate  Lower extremity: peripheral pulses palpable and no edema  Skin: intact    Neurological exam:    Awake, alert, oriented to person, place and time  Recent and remote memory were normal  Attention and concentration were intact  Language was intact. There was no aphasia  Speech: no dysarthria  Fund of knowledge was preserved    Cranial nerves:   II-XII were tested    Perrrla  Visual fields were full  Eomi, no evidence of nystagmus  Facial sensation:  normal and symmetric  Facial motor: normal and symmetric  Hearing intact  SCM strength intact  Tongue: midline without fasciculations    Motor: Tone normal  Pronator drift was absent  No evidence of fasciculations    Strength testing:   deltoid triceps biceps Wrist ext. Wrist flex. intrinsics Hip flex. Hip ext. Knee ext. Knee flex Dorsi flex Plantar flex   Right 5 5 5 5 5 4 5 5 5 5 5 5   Left 5 5 5 5 5 4 5 5 5 5 5 5         Sensory:  Upper extremity: intact to pp, light touch, and vibration > 10 seconds  Lower extremity: Pinprick was decreased to the level of the ankles today bilaterally. Vibration was present for 2 seconds in his toes and 5 seconds at his ankles. The vibration was unchanged since last visit      Reflexes:    Right Left  Biceps  2 2  Triceps 2 2  Brachiorad. 2 2  Patella  - -  Achilles - -    Plantar response:  flexor bilaterally    Cerebellar testing: There was no resting or action tremor today. Romberg:  Present. Gait: Wide-based gait.      Labs:     Lab Results   Component Value Date/Time    Hemoglobin A1c 5.8 (H) 09/13/2021 12:04 PM    Sodium 140 09/13/2021 12:04 PM    Potassium 4.8 09/13/2021 12:04 PM    Chloride 104 09/13/2021 12:04 PM    Glucose 87 09/13/2021 12:04 PM    BUN 11 09/13/2021 12:04 PM    Creatinine 0.78 09/13/2021 12:04 PM    Calcium 9.6 09/13/2021 12:04 PM    WBC 5.9 09/13/2021 12:04 PM    HCT 50.1 09/13/2021 12:04 PM    HGB 15.1 09/13/2021 12:04 PM    PLATELET 285 65/00/6541 12:04 PM       Imaging:    Results from [place] encounter on 10/17/19    MRI BRAIN WO CONT    Narrative  INDICATION: Abnormal gait, alcohol withdrawal.    Exam: Multiplanar noncontrast MRI of the brain is performed with T1, T2,  gradient, FLAIR and diffusion sequences. Direct comparison is made to prior CT dated October 17, 2019. FINDINGS: There is diffuse cortical atrophy. There is no restricted diffusion to  suggest acute infarct. The ventricular system is normal. There is a 4 mm focus  of susceptibility within the right parietal white matter; there is a probable  peripheral hemosiderin ring. The gray-white matter differentiation is otherwise  well-preserved. The cervicomedullary junction is normal and there is no  tonsillar ectopia. The visualized vascular flow-voids of the skull base are  normal. There is no herniation. Impression  IMPRESSION:  1. No acute infarct. 2. 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 Results from [place] encounter on 10/17/19    CT HEAD WO CONT    Narrative  EXAM: CT HEAD WO CONT    INDICATION: unsteady gait    COMPARISON: None. CONTRAST: None. TECHNIQUE: Unenhanced CT of the head was performed using 5 mm images. Brain and  bone windows were generated. CT dose reduction was achieved through use of a  standardized protocol tailored for this examination and automatic exposure  control for dose modulation. FINDINGS:  The ventricles and sulci are normal in size, shape and configuration and  midline. There is no significant white matter disease. There is no intracranial  hemorrhage, extra-axial collection, mass, mass effect or midline shift. The  basilar cisterns are open. No acute infarct is identified. The bone windows  demonstrate no abnormalities. The visualized portions of the paranasal sinuses  and mastoid air cells are clear. Severe bilateral vertebral artery  calcification. Impression  IMPRESSION:    No acute intracranial process seen. Severe bilateral vertebral artery calcification can be associated with  vertebrobasilar insufficiency syndrome.         Assessment and Plan:   This is a pleasant 75-year-old gentleman with multiple medical problems which impacts his neurological health who returns to clinic for his peripheral neuropathy, prior tremor, vascular disease. His neurological examination is notable for a rather severe distal length dependent sensorimotor neuropathy. His examination, however, has improved since initial visits and a slight improvement since his last visit. .      1. Severe peripheral neuropathy:  emg revealed a primarily axonal process. His heavy alcohol use is most likely a primary contributing factor. Serology looking for reversible causes of a neuropathy while hospitalized were unremarkable. With a change in lifestyle, his neuropathy symptoms have improved to some degree, however he still does have symptoms. Neuropathy:  we reviewed the causes contributing to the neuropathy. We discussed the importance of exercise and activity. I also reviewed the importance of safety with ambulation and ways to prevents falls. 2.  Tremor while hospitalized which was  predominantly action based and slightly asymmetric:  Examination has improved considerably. Not present again today. Most likely related to his etoh use and withdrawal.  MRI revealed no structural abnormality  Will continue to follow clinically.       3. Risk factors for stroke:  He does have multiple risk factors for stroke including hypertension and high cholesterol:  Continue aspirin daily. Continue aggressive blood pressure control. Continue statin therapy. He does follow closely with his primary care doctor      4. Carotid stenosis:  Upon last years testing, there was less than 50% stenosis bilaterally. Continue asa  Will order a follow up doppler next year. 5.  Prior Heavy alcohol use:  He had went alcohol free, but now does drink 1-2 beers a day. I did discuss with him that minimizing alcohol will help his nerves and overall balance.   He does acknowledge       Patient Active Problem List   Diagnosis Code    Hypertension I10    Hypercholesterolemia E78.00    Status post left hip replacement Z96.642    Hx of colonic polyp Z86.010    Diverticula of colon K57.30    Encounter for immunization Z23    BMI 27.0-27.9,adult Z68.27    Gait abnormality R26.9    Bilateral carotid artery stenosis I65.23    Vertebrobasilar artery insufficiency G45.0    Alcohol use disorder, moderate, in early remission ([facility] [address] F10.21    Left ventricular ejection fraction greater than 40% R94.30               The patient should return to clinic in one year    Renewed medication: none. I spent  32  minutes on the day of the encounter preparing the office visit by reviewing medical records, obtaining a history, performing examination, counseling and educating the patient on diagnosis, documenting in the clinical medical record, and coordinating the care for the patient. The patient had the ability to ask questions and all questions were answered.         Signed By:  Kaylyn Way DO FAAN    June 22, 2022

## 2022-06-22 ENCOUNTER — OFFICE VISIT (OUTPATIENT)
Dept: NEUROLOGY | Age: 77
End: 2022-06-22
Payer: MEDICARE

## 2022-06-22 VITALS
BODY MASS INDEX: 24.89 KG/M2 | HEIGHT: 67 IN | OXYGEN SATURATION: 97 % | DIASTOLIC BLOOD PRESSURE: 60 MMHG | WEIGHT: 158.6 LBS | RESPIRATION RATE: 16 BRPM | SYSTOLIC BLOOD PRESSURE: 112 MMHG | HEART RATE: 64 BPM

## 2022-06-22 DIAGNOSIS — G62.1 ALCOHOL-INDUCED POLYNEUROPATHY (HCC): Primary | ICD-10-CM

## 2022-06-22 DIAGNOSIS — I65.23 BILATERAL CAROTID ARTERY STENOSIS: ICD-10-CM

## 2022-06-22 PROCEDURE — 1123F ACP DISCUSS/DSCN MKR DOCD: CPT | Performed by: PSYCHIATRY & NEUROLOGY

## 2022-06-22 PROCEDURE — G8754 DIAS BP LESS 90: HCPCS | Performed by: PSYCHIATRY & NEUROLOGY

## 2022-06-22 PROCEDURE — 1101F PT FALLS ASSESS-DOCD LE1/YR: CPT | Performed by: PSYCHIATRY & NEUROLOGY

## 2022-06-22 PROCEDURE — G8427 DOCREV CUR MEDS BY ELIG CLIN: HCPCS | Performed by: PSYCHIATRY & NEUROLOGY

## 2022-06-22 PROCEDURE — G8420 CALC BMI NORM PARAMETERS: HCPCS | Performed by: PSYCHIATRY & NEUROLOGY

## 2022-06-22 PROCEDURE — G8510 SCR DEP NEG, NO PLAN REQD: HCPCS | Performed by: PSYCHIATRY & NEUROLOGY

## 2022-06-22 PROCEDURE — 99214 OFFICE O/P EST MOD 30 MIN: CPT | Performed by: PSYCHIATRY & NEUROLOGY

## 2022-06-22 PROCEDURE — G8752 SYS BP LESS 140: HCPCS | Performed by: PSYCHIATRY & NEUROLOGY

## 2022-06-22 PROCEDURE — G8536 NO DOC ELDER MAL SCRN: HCPCS | Performed by: PSYCHIATRY & NEUROLOGY

## 2022-06-22 NOTE — LETTER
6/22/2022    Patient: Paddy Krause   YOB: 1945   Date of Visit: 6/22/2022     Jaerd Cristobal NP  [facility] [address]  [address] [address]  [place] [facility] [address]    Dear Jared Cristobal NP,      Thank you for referring Mr. Darion Nunez to [address] [place] [address] for evaluation. My notes for this consultation are attached. If you have questions, please do not hesitate to call me. I look forward to following your patient along with you.       Sincerely,    Davion Suh, DO

## 2022-08-26 DIAGNOSIS — I10 ESSENTIAL HYPERTENSION: ICD-10-CM

## 2022-08-26 RX ORDER — AMLODIPINE BESYLATE 5 MG/1
TABLET ORAL
Qty: 30 TABLET | Refills: 0 | Status: SHIPPED | OUTPATIENT
Start: 2022-08-26 | End: 2022-09-12 | Stop reason: SDUPTHER

## 2022-09-12 ENCOUNTER — OFFICE VISIT (OUTPATIENT)
Dept: FAMILY MEDICINE CLINIC | Age: 77
End: 2022-09-12
Payer: MEDICARE

## 2022-09-12 VITALS
BODY MASS INDEX: 24.55 KG/M2 | OXYGEN SATURATION: 97 % | HEIGHT: 67 IN | WEIGHT: 156.4 LBS | HEART RATE: 71 BPM | DIASTOLIC BLOOD PRESSURE: 60 MMHG | TEMPERATURE: 97 F | RESPIRATION RATE: 16 BRPM | SYSTOLIC BLOOD PRESSURE: 120 MMHG

## 2022-09-12 DIAGNOSIS — I10 ESSENTIAL HYPERTENSION: ICD-10-CM

## 2022-09-12 DIAGNOSIS — Z11.59 ENCOUNTER FOR HEPATITIS C SCREENING TEST FOR LOW RISK PATIENT: ICD-10-CM

## 2022-09-12 DIAGNOSIS — F10.21 ALCOHOL USE DISORDER, MODERATE, IN EARLY REMISSION (HCC): ICD-10-CM

## 2022-09-12 DIAGNOSIS — Z00.00 MEDICARE ANNUAL WELLNESS VISIT, SUBSEQUENT: Primary | ICD-10-CM

## 2022-09-12 DIAGNOSIS — E78.00 HYPERCHOLESTEROLEMIA: ICD-10-CM

## 2022-09-12 DIAGNOSIS — Z23 ENCOUNTER FOR IMMUNIZATION: ICD-10-CM

## 2022-09-12 DIAGNOSIS — M35.3 PMR (POLYMYALGIA RHEUMATICA) (HCC): ICD-10-CM

## 2022-09-12 PROCEDURE — 90694 VACC AIIV4 NO PRSRV 0.5ML IM: CPT | Performed by: NURSE PRACTITIONER

## 2022-09-12 PROCEDURE — 36415 COLL VENOUS BLD VENIPUNCTURE: CPT | Performed by: NURSE PRACTITIONER

## 2022-09-12 PROCEDURE — G0008 ADMIN INFLUENZA VIRUS VAC: HCPCS | Performed by: NURSE PRACTITIONER

## 2022-09-12 PROCEDURE — G0439 PPPS, SUBSEQ VISIT: HCPCS | Performed by: NURSE PRACTITIONER

## 2022-09-12 RX ORDER — AMLODIPINE BESYLATE 5 MG/1
5 TABLET ORAL DAILY
Qty: 90 TABLET | Refills: 1 | Status: SHIPPED | OUTPATIENT
Start: 2022-09-12

## 2022-09-12 RX ORDER — SIMVASTATIN 20 MG/1
20 TABLET, FILM COATED ORAL
Qty: 90 TABLET | Refills: 1 | Status: SHIPPED | OUTPATIENT
Start: 2022-09-12

## 2022-09-12 RX ORDER — LISINOPRIL 20 MG/1
20 TABLET ORAL DAILY
Qty: 90 TABLET | Refills: 1 | Status: SHIPPED | OUTPATIENT
Start: 2022-09-12

## 2022-09-12 NOTE — PROGRESS NOTES
Vaccine updated. Flu shot given   # [id number] exp  6- Patient here for labs only drawn from left antecubital without pain or bruising. Left deltoid  Denies former reactions to shot and any allergies to chicken eggs or products. Patient here for labs  drawn from left antecubital without pain or bruising.                 ==      A                      Chief Complaint   Patient presents with    Annual Wellness Visit               No Known Allergies1. \"Have you been to the ER, urgent care clinic since your last visit? Hospitalized since your last visit? \" No    2. \"Have you seen or consulted any other health care providers outside of the [address] [place] [address] since your last visit? \" No     3. For patients aged 39-70: Has the patient had a colonoscopy / FIT/ Cologuard? NA - based on age      If the patient is female:    4. For patients aged 41-77: Has the patient had a mammogram within the past 2 years? NA - based on age or sex      11. For patients aged 21-65: Has the patient had a pap smear?  NA - based on age or sex        Cherry Melendez RN

## 2022-09-12 NOTE — PATIENT INSTRUCTIONS
Medicare Wellness Visit, Male    The best way to live healthy is to have a lifestyle where you eat a well-balanced diet, exercise regularly, limit alcohol use, and quit all forms of tobacco/nicotine, if applicable. Regular preventive services are another way to keep healthy. Preventive services (vaccines, screening tests, monitoring & exams) can help personalize your care plan, which helps you manage your own care. Screening tests can find health problems at the earliest stages, when they are easiest to treat. [facility] follows the current, evidence-based guidelines published by the [facility] (RUSTSTF) when recommending preventive services for our patients. Because we follow these guidelines, sometimes recommendations change over time as research supports it. (For example, a prostate screening blood test is no longer routinely recommended for men with no symptoms). Of course, you and your doctor may decide to screen more often for some diseases, based on your risk and co-morbidities (chronic disease you are already diagnosed with). Preventive services for you include:  - Medicare offers their members a free annual wellness visit, which is time for you and your primary care provider to discuss and plan for your preventive service needs. Take advantage of this benefit every year!  -All adults over age 72 should receive the recommended pneumonia vaccines. Current USPSTF guidelines recommend a series of two vaccines for the best pneumonia protection.   -All adults should have a flu vaccine yearly and tetanus vaccine every 10 years.  -All adults age 48 and older should receive the shingles vaccines (series of two vaccines).        -All adults age 38-68 who are overweight should have a diabetes screening test once every three years.   -Other screening tests & preventive services for persons with diabetes include: an eye exam to screen for diabetic retinopathy, a kidney function test, a foot exam, and stricter control over your cholesterol.   -Cardiovascular screening for adults with routine risk involves an electrocardiogram (ECG) at intervals determined by the provider.   -Colorectal cancer screening should be done for adults age 54-65 with no increased risk factors for colorectal cancer. There are a number of acceptable methods of screening for this type of cancer. Each test has its own benefits and drawbacks. Discuss with your provider what is most appropriate for you during your annual wellness visit. The different tests include: colonoscopy (considered the best screening method), a fecal occult blood test, a fecal DNA test, and sigmoidoscopy.  -All adults born between [facility] should be screened once for Hepatitis C.  -An Abdominal Aortic Aneurysm (AAA) Screening is recommended for men age 73-68 who has ever smoked in their lifetime. Here is a list of your current Health Maintenance items (your personalized list of preventive services) with a due date:  Health Maintenance Due   Topic Date Due    Hepatitis C Test  Never done    DTaP/Tdap/Td  (1 - Tdap) Never done    COVID-19 Vaccine (3 - Booster for Moderna series) 08/25/2021    Yearly Flu Vaccine (1) 09/01/2022    Cholesterol Test   09/13/2022       Medicare Wellness Visit, Male    The best way to live healthy is to have a lifestyle where you eat a well-balanced diet, exercise regularly, limit alcohol use, and quit all forms of tobacco/nicotine, if applicable. Regular preventive services are another way to keep healthy. Preventive services (vaccines, screening tests, monitoring & exams) can help personalize your care plan, which helps you manage your own care. Screening tests can find health problems at the earliest stages, when they are easiest to treat.    Mita follows the current, evidence-based guidelines published by the [facility] Task Force (USPSTF) when recommending preventive services for our patients. Because we follow these guidelines, sometimes recommendations change over time as research supports it. (For example, a prostate screening blood test is no longer routinely recommended for men with no symptoms). Of course, you and your doctor may decide to screen more often for some diseases, based on your risk and co-morbidities (chronic disease you are already diagnosed with). Preventive services for you include:  - Medicare offers their members a free annual wellness visit, which is time for you and your primary care provider to discuss and plan for your preventive service needs. Take advantage of this benefit every year!  -All adults over age 72 should receive the recommended pneumonia vaccines. Current USPSTF guidelines recommend a series of two vaccines for the best pneumonia protection.   -All adults should have a flu vaccine yearly and tetanus vaccine every 10 years.  -All adults age 48 and older should receive the shingles vaccines (series of two vaccines). -All adults age 38-68 who are overweight should have a diabetes screening test once every three years.   -Other screening tests & preventive services for persons with diabetes include: an eye exam to screen for diabetic retinopathy, a kidney function test, a foot exam, and stricter control over your cholesterol.   -Cardiovascular screening for adults with routine risk involves an electrocardiogram (ECG) at intervals determined by the provider.   -Colorectal cancer screening should be done for adults age 54-65 with no increased risk factors for colorectal cancer. There are a number of acceptable methods of screening for this type of cancer. Each test has its own benefits and drawbacks. Discuss with your provider what is most appropriate for you during your annual wellness visit.  The different tests include: colonoscopy (considered the best screening method), a fecal occult blood test, a fecal DNA test, and sigmoidoscopy.  -All adults born between [facility] should be screened once for Hepatitis C.  -An Abdominal Aortic Aneurysm (AAA) Screening is recommended for men age 73-68 who has ever smoked in their lifetime.      Here is a list of your current Health Maintenance items (your personalized list of preventive services) with a due date:  Health Maintenance Due   Topic Date Due    Hepatitis C Test  Never done    DTaP/Tdap/Td  (1 - Tdap) Never done    COVID-19 Vaccine (3 - Booster for Moderna series) 08/25/2021    Yearly Flu Vaccine (1) 09/01/2022    Cholesterol Test   09/13/2022

## 2022-09-13 LAB
ALBUMIN SERPL-MCNC: 3.9 G/DL (ref 3.5–5)
ALBUMIN/GLOB SERPL: 1.3 {RATIO} (ref 1.1–2.2)
ALP SERPL-CCNC: 76 U/L (ref 45–117)
ALT SERPL-CCNC: 28 U/L (ref 12–78)
ANION GAP SERPL CALC-SCNC: 5 MMOL/L (ref 5–15)
AST SERPL-CCNC: 22 U/L (ref 15–37)
BASOPHILS # BLD: 0 K/UL (ref 0–0.1)
BASOPHILS NFR BLD: 1 % (ref 0–1)
BILIRUB SERPL-MCNC: 0.6 MG/DL (ref 0.2–1)
BUN SERPL-MCNC: 13 MG/DL (ref 6–20)
BUN/CREAT SERPL: 18 (ref 12–20)
CALCIUM SERPL-MCNC: 9.5 MG/DL (ref 8.5–10.1)
CHLORIDE SERPL-SCNC: 101 MMOL/L (ref 97–108)
CHOLEST SERPL-MCNC: 161 MG/DL
CO2 SERPL-SCNC: 28 MMOL/L (ref 21–32)
CREAT SERPL-MCNC: 0.72 MG/DL (ref 0.7–1.3)
DIFFERENTIAL METHOD BLD: NORMAL
EOSINOPHIL # BLD: 0.3 K/UL (ref 0–0.4)
EOSINOPHIL NFR BLD: 5 % (ref 0–7)
ERYTHROCYTE [DISTWIDTH] IN BLOOD BY AUTOMATED COUNT: 12.5 % (ref 11.5–14.5)
GLOBULIN SER CALC-MCNC: 3.1 G/DL (ref 2–4)
GLUCOSE SERPL-MCNC: 101 MG/DL (ref 65–100)
HCT VFR BLD AUTO: 45.1 % (ref 36.6–50.3)
HCV AB SERPL QL IA: NONREACTIVE
HDLC SERPL-MCNC: 43 MG/DL
HDLC SERPL: 3.7 {RATIO} (ref 0–5)
HGB BLD-MCNC: 14.5 G/DL (ref 12.1–17)
IMM GRANULOCYTES # BLD AUTO: 0 K/UL (ref 0–0.04)
IMM GRANULOCYTES NFR BLD AUTO: 0 % (ref 0–0.5)
LDLC SERPL CALC-MCNC: 66.8 MG/DL (ref 0–100)
LYMPHOCYTES # BLD: 1.3 K/UL (ref 0.8–3.5)
LYMPHOCYTES NFR BLD: 18 % (ref 12–49)
MCH RBC QN AUTO: 30 PG (ref 26–34)
MCHC RBC AUTO-ENTMCNC: 32.2 G/DL (ref 30–36.5)
MCV RBC AUTO: 93.2 FL (ref 80–99)
MONOCYTES # BLD: 0.9 K/UL (ref 0–1)
MONOCYTES NFR BLD: 13 % (ref 5–13)
NEUTS SEG # BLD: 4.6 K/UL (ref 1.8–8)
NEUTS SEG NFR BLD: 63 % (ref 32–75)
NRBC # BLD: 0 K/UL (ref 0–0.01)
NRBC BLD-RTO: 0 PER 100 WBC
PLATELET # BLD AUTO: 253 K/UL (ref 150–400)
PMV BLD AUTO: 9.2 FL (ref 8.9–12.9)
POTASSIUM SERPL-SCNC: 4.4 MMOL/L (ref 3.5–5.1)
PROT SERPL-MCNC: 7 G/DL (ref 6.4–8.2)
RBC # BLD AUTO: 4.84 M/UL (ref 4.1–5.7)
SODIUM SERPL-SCNC: 134 MMOL/L (ref 136–145)
TRIGL SERPL-MCNC: 256 MG/DL (ref ?–150)
VLDLC SERPL CALC-MCNC: 51.2 MG/DL
WBC # BLD AUTO: 7.2 K/UL (ref 4.1–11.1)

## 2022-09-15 NOTE — PROGRESS NOTES
This is a Subsequent Medicare Annual Wellness Exam (AWV) (Performed 12 months after IPPE or effective date of Medicare Part B enrollment)    I have reviewed the patient's medical history in detail and updated the computerized patient record. Visit Vitals  /60 (BP 1 Location: Right arm, BP Patient Position: Sitting, BP Cuff Size: Adult)   Pulse 71   Temp 97 °F (36.1 °C) (Temporal)   Resp 16   Ht 5' 7\" (1.702 m)   Wt 156 lb 6.4 oz (70.9 kg)   SpO2 97%   BMI 24.50 kg/m²      History     Past Medical History:   Diagnosis Date    Gout     Hypercholesterolemia     Hypertension     Hypokalemia       Past Surgical History:   Procedure Laterality Date    COLONOSCOPY N/A 4/23/2018    COLONOSCOPY performed by Obinna Mace MD at [address], COLON, DIAGNOSTIC      HX COLONOSCOPY  2011    HX COLONOSCOPY  2018    polyps    HX COLONOSCOPY  08/06/2021    HX CYST REMOVAL      rt foot x2    HX HEENT      bilat cataract repair    HX ORTHOPAEDIC      left ankle fx  rt ankle fx    HX ORTHOPAEDIC      L hip repair     Current Outpatient Medications   Medication Sig Dispense Refill    amLODIPine (NORVASC) 5 mg tablet Take 1 Tablet by mouth daily. 90 Tablet 1    simvastatin (ZOCOR) 20 mg tablet Take 1 Tablet by mouth nightly. 90 Tablet 1    lisinopriL (PRINIVIL, ZESTRIL) 20 mg tablet Take 1 Tablet by mouth daily. 90 Tablet 1    aspirin 81 mg chewable tablet Take 1 Tab by mouth daily. 90 Tab 1    thiamine mononitrate (B-1) 100 mg tablet Take 1 Tab by mouth daily. 60 Tab 3    VIT C/E/ZN/COPPR/LUTEIN/ZEAXAN (PRESERVISION AREDS 2 PO) Take 1 Tab by mouth two (2) times a day. No Known Allergies  Family History   Problem Relation Age of Onset    Diabetes Mother     Dementia Mother     Heart Failure Father     Obesity Brother     Hypertension Brother      Social History     Tobacco Use    Smoking status: Never    Smokeless tobacco: Never   Substance Use Topics    Alcohol use:  Yes     Alcohol/week: 12.5 - 15.0 standard drinks     Types: 15 - 18 Standard drinks or equivalent per week     Comment: mariely     Patient Active Problem List   Diagnosis Code    Hypertension I10    Hypercholesterolemia E78.00    Status post left hip replacement Z96.642    Hx of colonic polyp Z86.010    Diverticula of colon K57.30    Encounter for immunization Z23    BMI 27.0-27.9,adult Z68.27    Gait abnormality R26.9    Bilateral carotid artery stenosis I65.23    Vertebrobasilar artery insufficiency G45.0    Alcohol use disorder, moderate, in early remission ([facility]) F10.21    Left ventricular ejection fraction greater than 40% R94.30    PMR (polymyalgia rheumatica) ([facility]) M35.3       Depression Risk Factor Screening:     3 most recent PHQ Screens 9/12/2022   PHQ Not Done -   Little interest or pleasure in doing things Not at all   Feeling down, depressed, irritable, or hopeless Not at all   Total Score PHQ 2 0   Trouble falling or staying asleep, or sleeping too much Not at all   Feeling tired or having little energy Not at all   Poor appetite, weight loss, or overeating Not at all   Feeling bad about yourself - or that you are a failure or have let yourself or your family down Not at all   Trouble concentrating on things such as school, work, reading, or watching TV Not at all   Moving or speaking so slowly that other people could have noticed; or the opposite being so fidgety that others notice Not at all   Thoughts of being better off dead, or hurting yourself in some way Not at all   PHQ 9 Score 0   How difficult have these problems made it for you to do your work, take care of your home and get along with others Not difficult at all     Alcohol Risk Factor Screening: You average more than 14 drinks a week. Functional Ability and Level of Safety:   Hearing Loss  The patient wears hearing aids.     Activities of Daily Living  The home contains: handrails  Patient does total self care    Fall Risk  Fall Risk Assessment, last 12 mths 9/12/2022   Able to walk? Yes   Fall in past 12 months? 0   Do you feel unsteady? 0   Are you worried about falling 0       Abuse Screen  Patient is not abused    Cognitive Screening   Evaluation of Cognitive Function:  Has your family/caregiver stated any concerns about your memory: no      Patient Care Team   Patient Care Team:  Montse Ledbetter NP as PCP - General (Nurse Practitioner)  Montse Ledbetter NP as PCP - [facility] Empaneled Provider  Emily Owusu MD (Surgery Physician)    Assessment/Plan   Education and counseling provided:  Are appropriate based on today's review and evaluation  End-of-Life planning (with patient's consent)  Pneumococcal Vaccine  Influenza Vaccine  Colorectal cancer screening tests    Diagnoses and all orders for this visit:    1. Medicare annual wellness visit, subsequent  -     INFLUENZA, FLUAD, (AGE 72 Y+), IM, PF, 0.5 ML  -     ADMIN INFLUENZA VIRUS VAC  -     CBC WITH AUTOMATED DIFF; Future  -     LIPID PANEL; Future  -     METABOLIC PANEL, COMPREHENSIVE; Future  -     COLLECTION VENOUS BLOOD,VENIPUNCTURE; Future    2. PMR (polymyalgia rheumatica) ([facility])  Assessment & Plan:   well controlled, continue current medications    Orders:  -     INFLUENZA, FLUAD, (AGE 72 Y+), IM, PF, 0.5 ML  -     ADMIN INFLUENZA VIRUS VAC  -     CBC WITH AUTOMATED DIFF; Future  -     LIPID PANEL; Future  -     METABOLIC PANEL, COMPREHENSIVE; Future  -     COLLECTION VENOUS BLOOD,VENIPUNCTURE; Future    3. Alcohol use disorder, moderate, in early remission [facility])  Assessment & Plan:   well controlled, continue current medications    Orders:  -     CBC WITH AUTOMATED DIFF; Future  -     LIPID PANEL; Future  -     METABOLIC PANEL, COMPREHENSIVE; Future  -     COLLECTION VENOUS BLOOD,VENIPUNCTURE; Future    4. Encounter for immunization  -     INFLUENZA, FLUAD, (AGE 72 Y+), IM, PF, 0.5 ML  -     ADMIN INFLUENZA VIRUS VAC  -     CBC WITH AUTOMATED DIFF; Future  -     LIPID PANEL;  Future  -     METABOLIC PANEL, COMPREHENSIVE; Future  -     COLLECTION VENOUS BLOOD,VENIPUNCTURE; Future    5. Essential hypertension  -     amLODIPine (NORVASC) 5 mg tablet; Take 1 Tablet by mouth daily. -     lisinopriL (PRINIVIL, ZESTRIL) 20 mg tablet; Take 1 Tablet by mouth daily.  -     CBC WITH AUTOMATED DIFF; Future  -     LIPID PANEL; Future  -     METABOLIC PANEL, COMPREHENSIVE; Future  -     COLLECTION VENOUS BLOOD,VENIPUNCTURE; Future    6. Hypercholesterolemia  -     simvastatin (ZOCOR) 20 mg tablet; Take 1 Tablet by mouth nightly. -     CBC WITH AUTOMATED DIFF; Future  -     LIPID PANEL; Future  -     METABOLIC PANEL, COMPREHENSIVE; Future  -     COLLECTION VENOUS BLOOD,VENIPUNCTURE; Future    7. Encounter for hepatitis C screening test for low risk patient  -     HEPATITIS C AB; Future        Health Maintenance Due   Topic Date Due    DTaP/Tdap/Td series (1 - Tdap) Never done    COVID-19 Vaccine (3 - Booster for Moderna series) 08/25/2021         HPI:  Dolores Felton also presents for follow up of chronic conditions. Patient Active Problem List    Diagnosis    PMR (polymyalgia rheumatica) (HCC)    Alcohol use disorder, moderate, in early remission ([facility]    Left ventricular ejection fraction greater than 40%     51 to 55% as per echocardiogram that was done during hospitalization. Recommended follow-up in 6 to 12 months. Gait abnormality    Bilateral carotid artery stenosis    Vertebrobasilar artery insufficiency    BMI 27.0-27.9,adult    Encounter for immunization    Hx of colonic polyp    Diverticula of colon    Status post left hip replacement    Hypertension    Hypercholesterolemia       See Past Medical History, Surgical History, Social History, Medications, and Allergies documented above. ROS:  ROS negative except as per HPI.     PE:  Visit Vitals  /60 (BP 1 Location: Right arm, BP Patient Position: Sitting, BP Cuff Size: Adult)   Pulse 71   Temp 97 °F (36.1 °C) (Temporal)   Resp 16   Ht 5' 7\" (1.702 m) Wt 156 lb 6.4 oz (70.9 kg)   SpO2 97%   BMI 24.50 kg/m²     Gen: alert, oriented, no acute distress  Head: normocephalic, atraumatic  Ears: external auditory canals clear, TMs without erythema or effusion  Eyes: pupils equal round reactive to light, sclera clear, conjunctiva clear  Oral: moist mucus membranes, no oral lesions, no pharyngeal inflammation or exudate  Neck: symmetric normal sized thyroid, no carotid bruits, no jugular vein distention  Resp: no increase work of breathing, lungs clear to ausculation bilaterally, no wheezing, rales or rhonchi  CV: S1, S2 normal.  No murmurs, rubs, or gallops. Abd: soft, not tender, not distended. No hepatosplenomegaly. Normal bowel sounds. Neuro: cranial nerves intact, normal strength and movement in all extremities, reflexes and sensation intact and symmetric. Skin: no lesion or rash  Extremities: no cyanosis or edema    Results for orders placed or performed in visit on 09/12/22   HEPATITIS C AB   Result Value Ref Range    Hep C virus Ab Interp. NONREACTIVE NONREACTIVE     METABOLIC PANEL, COMPREHENSIVE   Result Value Ref Range    Sodium 134 (L) 136 - 145 mmol/L    Potassium 4.4 3.5 - 5.1 mmol/L    Chloride 101 97 - 108 mmol/L    CO2 28 21 - 32 mmol/L    Anion gap 5 5 - 15 mmol/L    Glucose 101 (H) 65 - 100 mg/dL    BUN 13 6 - 20 MG/DL    Creatinine 0.72 0.70 - 1.30 MG/DL    BUN/Creatinine ratio 18 12 - 20      GFR est AA >60 >60 ml/min/1.73m2    GFR est non-AA >60 >60 ml/min/1.73m2    Calcium 9.5 8.5 - 10.1 MG/DL    Bilirubin, total 0.6 0.2 - 1.0 MG/DL    ALT (SGPT) 28 12 - 78 U/L    AST (SGOT) 22 15 - 37 U/L    Alk.  phosphatase 76 45 - 117 U/L    Protein, total 7.0 6.4 - 8.2 g/dL    Albumin 3.9 3.5 - 5.0 g/dL    Globulin 3.1 2.0 - 4.0 g/dL    A-G Ratio 1.3 1.1 - 2.2     LIPID PANEL   Result Value Ref Range    Cholesterol, total 161 <200 MG/DL    Triglyceride 256 (H) <150 MG/DL    HDL Cholesterol 43 MG/DL    LDL, calculated 66.8 0 - 100 MG/DL    VLDL, calculated 51.2 MG/DL    CHOL/HDL Ratio 3.7 0.0 - 5.0     CBC WITH AUTOMATED DIFF   Result Value Ref Range    WBC 7.2 4.1 - 11.1 K/uL    RBC 4.84 4.10 - 5.70 M/uL    HGB 14.5 12.1 - 17.0 g/dL    HCT 45.1 36.6 - 50.3 %    MCV 93.2 80.0 - 99.0 FL    MCH 30.0 26.0 - 34.0 PG    MCHC 32.2 30.0 - 36.5 g/dL    RDW 12.5 11.5 - 14.5 %    PLATELET 713 897 - 074 K/uL    MPV 9.2 8.9 - 12.9 FL    NRBC 0.0 0  WBC    ABSOLUTE NRBC 0.00 0.00 - 0.01 K/uL    NEUTROPHILS 63 32 - 75 %    LYMPHOCYTES 18 12 - 49 %    MONOCYTES 13 5 - 13 %    EOSINOPHILS 5 0 - 7 %    BASOPHILS 1 0 - 1 %    IMMATURE GRANULOCYTES 0 0.0 - 0.5 %    ABS. NEUTROPHILS 4.6 1.8 - 8.0 K/UL    ABS. LYMPHOCYTES 1.3 0.8 - 3.5 K/UL    ABS. MONOCYTES 0.9 0.0 - 1.0 K/UL    ABS. EOSINOPHILS 0.3 0.0 - 0.4 K/UL    ABS. BASOPHILS 0.0 0.0 - 0.1 K/UL    ABS. IMM. GRANS. 0.0 0.00 - 0.04 K/UL    DF AUTOMATED         Assessment/Plan:    1. Medicare annual wellness visit, subsequent    - INFLUENZA, FLUAD, (AGE 72 Y+), IM, PF, 0.5 ML  - ADMIN INFLUENZA VIRUS VAC  - CBC WITH AUTOMATED DIFF; Future  - LIPID PANEL; Future  - METABOLIC PANEL, COMPREHENSIVE; Future  - COLLECTION VENOUS BLOOD,VENIPUNCTURE; Future  - COLLECTION VENOUS BLOOD,VENIPUNCTURE  - METABOLIC PANEL, COMPREHENSIVE  - LIPID PANEL  - CBC WITH AUTOMATED DIFF    2. PMR (polymyalgia rheumatica) (HCC)    - INFLUENZA, FLUAD, (AGE 65 Y+), IM, PF, 0.5 ML  - ADMIN INFLUENZA VIRUS VAC  - CBC WITH AUTOMATED DIFF; Future  - LIPID PANEL; Future  - METABOLIC PANEL, COMPREHENSIVE; Future  - COLLECTION VENOUS BLOOD,VENIPUNCTURE; Future  - COLLECTION VENOUS BLOOD,VENIPUNCTURE  - METABOLIC PANEL, COMPREHENSIVE  - LIPID PANEL  - CBC WITH AUTOMATED DIFF    3. Alcohol use disorder, moderate, in early remission ([facility] [address]    - CBC WITH AUTOMATED DIFF; Future  - LIPID PANEL; Future  - METABOLIC PANEL, COMPREHENSIVE;  Future  - COLLECTION VENOUS BLOOD,VENIPUNCTURE; Future  - COLLECTION VENOUS BLOOD,VENIPUNCTURE  - METABOLIC PANEL, COMPREHENSIVE  - LIPID PANEL  - CBC WITH AUTOMATED DIFF    4. Encounter for immunization    - INFLUENZA, FLUAD, (AGE 65 Y+), IM, PF, 0.5 ML  - ADMIN INFLUENZA VIRUS VAC      5. Essential hypertension  BP in goal  Continue   - amLODIPine (NORVASC) 5 mg tablet; Take 1 Tablet by mouth daily. Dispense: 90 Tablet; Refill: 1  - lisinopriL (PRINIVIL, ZESTRIL) 20 mg tablet; Take 1 Tablet by mouth daily. Dispense: 90 Tablet; Refill: 1  - CBC WITH AUTOMATED DIFF; Future  - LIPID PANEL; Future  - METABOLIC PANEL, COMPREHENSIVE; Future  - COLLECTION VENOUS BLOOD,VENIPUNCTURE; Future  - COLLECTION VENOUS BLOOD,VENIPUNCTURE  - METABOLIC PANEL, COMPREHENSIVE  - LIPID PANEL  - CBC WITH AUTOMATED DIFF    6. Hypercholesterolemia  BP in goal   Continue   - simvastatin (ZOCOR) 20 mg tablet; Take 1 Tablet by mouth nightly. Dispense: 90 Tablet; Refill: 1  - CBC WITH AUTOMATED DIFF; Future  - LIPID PANEL; Future  - METABOLIC PANEL, COMPREHENSIVE; Future  - COLLECTION VENOUS BLOOD,VENIPUNCTURE; Future  - COLLECTION VENOUS BLOOD,VENIPUNCTURE  - METABOLIC PANEL, COMPREHENSIVE  - LIPID PANEL  - CBC WITH AUTOMATED DIFF    7. Encounter for hepatitis C screening test for low risk patient    - HEPATITIS C AB;  Future  - HEPATITIS C AB      Orders Placed This Encounter    COLLECTION VENOUS BLOOD,VENIPUNCTURE     Standing Status:   Future     Number of Occurrences:   1     Standing Expiration Date:   10/12/2022    Influenza, FLUAD, (age 72 y+), IM, PF, 0.5 mL    CBC WITH AUTOMATED DIFF     Standing Status:   Future     Number of Occurrences:   1     Standing Expiration Date:   10/12/2022    LIPID PANEL     Standing Status:   Future     Number of Occurrences:   1     Standing Expiration Date:   13/11/2650    METABOLIC PANEL, COMPREHENSIVE     Standing Status:   Future     Number of Occurrences:   1     Standing Expiration Date:   10/12/2022    HEPATITIS C AB     Standing Status:   Future     Number of Occurrences:   1     Standing Expiration Date:   10/12/2022    ADMIN INFLUENZA VIRUS VAC    amLODIPine (NORVASC) 5 mg tablet     Sig: Take 1 Tablet by mouth daily. Dispense:  90 Tablet     Refill:  1     Due for annual appointment    simvastatin (ZOCOR) 20 mg tablet     Sig: Take 1 Tablet by mouth nightly. Dispense:  90 Tablet     Refill:  1     This prescription was filled on 2/18/2022. Any refills authorized will be placed on file. lisinopriL (PRINIVIL, ZESTRIL) 20 mg tablet     Sig: Take 1 Tablet by mouth daily. Dispense:  90 Tablet     Refill:  1       Medications Discontinued During This Encounter   Medication Reason    lisinopriL (PRINIVIL, ZESTRIL) 20 mg tablet REORDER    simvastatin (ZOCOR) 20 mg tablet REORDER    amLODIPine (NORVASC) 5 mg tablet REORDER       Current Outpatient Medications   Medication Sig Dispense Refill    amLODIPine (NORVASC) 5 mg tablet Take 1 Tablet by mouth daily. 90 Tablet 1    simvastatin (ZOCOR) 20 mg tablet Take 1 Tablet by mouth nightly. 90 Tablet 1    lisinopriL (PRINIVIL, ZESTRIL) 20 mg tablet Take 1 Tablet by mouth daily. 90 Tablet 1    aspirin 81 mg chewable tablet Take 1 Tab by mouth daily. 90 Tab 1    thiamine mononitrate (B-1) 100 mg tablet Take 1 Tab by mouth daily. 60 Tab 3    VIT C/E/ZN/COPPR/LUTEIN/ZEAXAN (PRESERVISION AREDS 2 PO) Take 1 Tab by mouth two (2) times a day. Recommended healthy diet low in carbohydrates, fats, sodium and cholesterol. Recommended regular cardiovascular exercise 3-6 times per week for 30-60 minutes daily. Verbal and written instructions (see AVS) provided. Patient expresses understanding of diagnosis and treatment plan.       Anya CHANDLER

## 2022-09-15 NOTE — ACP (ADVANCE CARE PLANNING)
Has advanced medical directive scanned into chart. Reviewed at this visit. No changes.   Cristal CHANDLER

## 2023-03-27 DIAGNOSIS — I10 ESSENTIAL HYPERTENSION: ICD-10-CM

## 2023-03-27 RX ORDER — AMLODIPINE BESYLATE 5 MG/1
TABLET ORAL
Qty: 90 TABLET | Refills: 1 | Status: SHIPPED | OUTPATIENT
Start: 2023-03-27

## 2023-05-03 DIAGNOSIS — I10 ESSENTIAL HYPERTENSION: ICD-10-CM

## 2023-05-03 RX ORDER — LISINOPRIL 20 MG/1
TABLET ORAL
Qty: 90 TABLET | Refills: 1 | Status: SHIPPED | OUTPATIENT
Start: 2023-05-03

## 2023-05-19 RX ORDER — ASPIRIN 81 MG/1
81 TABLET, CHEWABLE ORAL DAILY
COMMUNITY
Start: 2020-04-03

## 2023-05-19 RX ORDER — LISINOPRIL 20 MG/1
1 TABLET ORAL DAILY
COMMUNITY
Start: 2023-05-03

## 2023-05-19 RX ORDER — LANOLIN ALCOHOL/MO/W.PET/CERES
100 CREAM (GRAM) TOPICAL DAILY
COMMUNITY
Start: 2019-10-22

## 2023-05-19 RX ORDER — AMLODIPINE BESYLATE 5 MG/1
1 TABLET ORAL DAILY
COMMUNITY
Start: 2023-03-27

## 2023-05-19 RX ORDER — SIMVASTATIN 20 MG
1 TABLET ORAL NIGHTLY
COMMUNITY
Start: 2022-09-12 | End: 2023-05-25 | Stop reason: SDUPTHER

## 2023-05-25 RX ORDER — SIMVASTATIN 20 MG
20 TABLET ORAL NIGHTLY
Qty: 30 TABLET | Refills: 5 | Status: SHIPPED | OUTPATIENT
Start: 2023-05-25

## 2023-09-26 ENCOUNTER — OFFICE VISIT (OUTPATIENT)
Age: 78
End: 2023-09-26
Payer: MEDICARE

## 2023-09-26 VITALS
HEART RATE: 72 BPM | RESPIRATION RATE: 18 BRPM | TEMPERATURE: 98.1 F | OXYGEN SATURATION: 97 % | SYSTOLIC BLOOD PRESSURE: 108 MMHG | HEIGHT: 65 IN | DIASTOLIC BLOOD PRESSURE: 54 MMHG | WEIGHT: 150 LBS | BODY MASS INDEX: 24.99 KG/M2

## 2023-09-26 DIAGNOSIS — I10 PRIMARY HYPERTENSION: ICD-10-CM

## 2023-09-26 DIAGNOSIS — M35.3 POLYMYALGIA RHEUMATICA (HCC): ICD-10-CM

## 2023-09-26 DIAGNOSIS — F10.21 ALCOHOL DEPENDENCE, IN REMISSION (HCC): ICD-10-CM

## 2023-09-26 DIAGNOSIS — R35.1 NOCTURIA: ICD-10-CM

## 2023-09-26 DIAGNOSIS — Z00.00 ENCOUNTER FOR MEDICARE ANNUAL WELLNESS EXAM: Primary | ICD-10-CM

## 2023-09-26 DIAGNOSIS — E78.00 PURE HYPERCHOLESTEROLEMIA, UNSPECIFIED: ICD-10-CM

## 2023-09-26 DIAGNOSIS — I10 ESSENTIAL (PRIMARY) HYPERTENSION: ICD-10-CM

## 2023-09-26 DIAGNOSIS — G62.1 ALCOHOLIC POLYNEUROPATHY (HCC): ICD-10-CM

## 2023-09-26 DIAGNOSIS — Z23 NEEDS FLU SHOT: ICD-10-CM

## 2023-09-26 PROCEDURE — 3078F DIAST BP <80 MM HG: CPT | Performed by: NURSE PRACTITIONER

## 2023-09-26 PROCEDURE — 36415 COLL VENOUS BLD VENIPUNCTURE: CPT | Performed by: NURSE PRACTITIONER

## 2023-09-26 PROCEDURE — G0008 ADMIN INFLUENZA VIRUS VAC: HCPCS | Performed by: NURSE PRACTITIONER

## 2023-09-26 PROCEDURE — G0439 PPPS, SUBSEQ VISIT: HCPCS | Performed by: NURSE PRACTITIONER

## 2023-09-26 PROCEDURE — 90694 VACC AIIV4 NO PRSRV 0.5ML IM: CPT | Performed by: NURSE PRACTITIONER

## 2023-09-26 PROCEDURE — 1123F ACP DISCUSS/DSCN MKR DOCD: CPT | Performed by: NURSE PRACTITIONER

## 2023-09-26 PROCEDURE — 3074F SYST BP LT 130 MM HG: CPT | Performed by: NURSE PRACTITIONER

## 2023-09-26 SDOH — ECONOMIC STABILITY: FOOD INSECURITY: WITHIN THE PAST 12 MONTHS, THE FOOD YOU BOUGHT JUST DIDN'T LAST AND YOU DIDN'T HAVE MONEY TO GET MORE.: NEVER TRUE

## 2023-09-26 SDOH — ECONOMIC STABILITY: HOUSING INSECURITY
IN THE LAST 12 MONTHS, WAS THERE A TIME WHEN YOU DID NOT HAVE A STEADY PLACE TO SLEEP OR SLEPT IN A SHELTER (INCLUDING NOW)?: NO

## 2023-09-26 SDOH — HEALTH STABILITY: PHYSICAL HEALTH: ON AVERAGE, HOW MANY MINUTES DO YOU ENGAGE IN EXERCISE AT THIS LEVEL?: 120 MIN

## 2023-09-26 SDOH — ECONOMIC STABILITY: FOOD INSECURITY: WITHIN THE PAST 12 MONTHS, YOU WORRIED THAT YOUR FOOD WOULD RUN OUT BEFORE YOU GOT MONEY TO BUY MORE.: NEVER TRUE

## 2023-09-26 SDOH — HEALTH STABILITY: PHYSICAL HEALTH: ON AVERAGE, HOW MANY DAYS PER WEEK DO YOU ENGAGE IN MODERATE TO STRENUOUS EXERCISE (LIKE A BRISK WALK)?: 5 DAYS

## 2023-09-26 SDOH — ECONOMIC STABILITY: INCOME INSECURITY: IN THE LAST 12 MONTHS, WAS THERE A TIME WHEN YOU WERE NOT ABLE TO PAY THE MORTGAGE OR RENT ON TIME?: NO

## 2023-09-26 SDOH — ECONOMIC STABILITY: HOUSING INSECURITY: IN THE LAST 12 MONTHS, HOW MANY PLACES HAVE YOU LIVED?: 1

## 2023-09-26 ASSESSMENT — LIFESTYLE VARIABLES
HOW OFTEN DO YOU HAVE A DRINK CONTAINING ALCOHOL: NEVER
HOW MANY STANDARD DRINKS CONTAINING ALCOHOL DO YOU HAVE ON A TYPICAL DAY: PATIENT DOES NOT DRINK

## 2023-09-26 ASSESSMENT — PATIENT HEALTH QUESTIONNAIRE - PHQ9
SUM OF ALL RESPONSES TO PHQ QUESTIONS 1-9: 0
SUM OF ALL RESPONSES TO PHQ QUESTIONS 1-9: 0
SUM OF ALL RESPONSES TO PHQ9 QUESTIONS 1 & 2: 0
1. LITTLE INTEREST OR PLEASURE IN DOING THINGS: 0
SUM OF ALL RESPONSES TO PHQ QUESTIONS 1-9: 0
SUM OF ALL RESPONSES TO PHQ QUESTIONS 1-9: 0
2. FEELING DOWN, DEPRESSED OR HOPELESS: 0

## 2023-09-26 ASSESSMENT — SOCIAL DETERMINANTS OF HEALTH (SDOH)
WITHIN THE LAST YEAR, HAVE YOU BEEN KICKED, HIT, SLAPPED, OR OTHERWISE PHYSICALLY HURT BY YOUR PARTNER OR EX-PARTNER?: NO
WITHIN THE LAST YEAR, HAVE YOU BEEN AFRAID OF YOUR PARTNER OR EX-PARTNER?: NO
HOW OFTEN DO YOU ATTENT MEETINGS OF THE CLUB OR ORGANIZATION YOU BELONG TO?: NEVER
WITHIN THE LAST YEAR, HAVE YOU BEEN HUMILIATED OR EMOTIONALLY ABUSED IN OTHER WAYS BY YOUR PARTNER OR EX-PARTNER?: NO
DO YOU BELONG TO ANY CLUBS OR ORGANIZATIONS SUCH AS CHURCH GROUPS UNIONS, FRATERNAL OR ATHLETIC GROUPS, OR SCHOOL GROUPS?: NO
WITHIN THE LAST YEAR, HAVE TO BEEN RAPED OR FORCED TO HAVE ANY KIND OF SEXUAL ACTIVITY BY YOUR PARTNER OR EX-PARTNER?: NO

## 2023-09-27 LAB
ALBUMIN SERPL-MCNC: 4 G/DL (ref 3.5–5)
ALBUMIN/GLOB SERPL: 1.5 (ref 1.1–2.2)
ALP SERPL-CCNC: 77 U/L (ref 45–117)
ALT SERPL-CCNC: 40 U/L (ref 12–78)
ANION GAP SERPL CALC-SCNC: 7 MMOL/L (ref 5–15)
AST SERPL-CCNC: 26 U/L (ref 15–37)
BASOPHILS # BLD: 0 K/UL (ref 0–0.1)
BASOPHILS NFR BLD: 0 % (ref 0–1)
BILIRUB SERPL-MCNC: 0.7 MG/DL (ref 0.2–1)
BUN SERPL-MCNC: 14 MG/DL (ref 6–20)
BUN/CREAT SERPL: 19 (ref 12–20)
CALCIUM SERPL-MCNC: 9.1 MG/DL (ref 8.5–10.1)
CHLORIDE SERPL-SCNC: 109 MMOL/L (ref 97–108)
CHOLEST SERPL-MCNC: 170 MG/DL
CO2 SERPL-SCNC: 25 MMOL/L (ref 21–32)
CREAT SERPL-MCNC: 0.73 MG/DL (ref 0.7–1.3)
DIFFERENTIAL METHOD BLD: NORMAL
EOSINOPHIL # BLD: 0.1 K/UL (ref 0–0.4)
EOSINOPHIL NFR BLD: 1 % (ref 0–7)
ERYTHROCYTE [DISTWIDTH] IN BLOOD BY AUTOMATED COUNT: 13.5 % (ref 11.5–14.5)
GLOBULIN SER CALC-MCNC: 2.7 G/DL (ref 2–4)
GLUCOSE SERPL-MCNC: 86 MG/DL (ref 65–100)
HCT VFR BLD AUTO: 48 % (ref 36.6–50.3)
HDLC SERPL-MCNC: 52 MG/DL
HDLC SERPL: 3.3 (ref 0–5)
HGB BLD-MCNC: 14.8 G/DL (ref 12.1–17)
IMM GRANULOCYTES # BLD AUTO: 0 K/UL (ref 0–0.04)
IMM GRANULOCYTES NFR BLD AUTO: 0 % (ref 0–0.5)
LDLC SERPL CALC-MCNC: 92.8 MG/DL (ref 0–100)
LYMPHOCYTES # BLD: 1.4 K/UL (ref 0.8–3.5)
LYMPHOCYTES NFR BLD: 21 % (ref 12–49)
MCH RBC QN AUTO: 30.1 PG (ref 26–34)
MCHC RBC AUTO-ENTMCNC: 30.8 G/DL (ref 30–36.5)
MCV RBC AUTO: 97.6 FL (ref 80–99)
MONOCYTES # BLD: 0.6 K/UL (ref 0–1)
MONOCYTES NFR BLD: 8 % (ref 5–13)
NEUTS SEG # BLD: 4.7 K/UL (ref 1.8–8)
NEUTS SEG NFR BLD: 70 % (ref 32–75)
NRBC # BLD: 0 K/UL (ref 0–0.01)
NRBC BLD-RTO: 0 PER 100 WBC
PLATELET # BLD AUTO: 212 K/UL (ref 150–400)
PMV BLD AUTO: 10.6 FL (ref 8.9–12.9)
POTASSIUM SERPL-SCNC: 4.8 MMOL/L (ref 3.5–5.1)
PROT SERPL-MCNC: 6.7 G/DL (ref 6.4–8.2)
PSA SERPL-MCNC: 2.5 NG/ML (ref 0.01–4)
RBC # BLD AUTO: 4.92 M/UL (ref 4.1–5.7)
SODIUM SERPL-SCNC: 141 MMOL/L (ref 136–145)
TRIGL SERPL-MCNC: 126 MG/DL
VIT B12 SERPL-MCNC: 310 PG/ML (ref 193–986)
VLDLC SERPL CALC-MCNC: 25.2 MG/DL
WBC # BLD AUTO: 6.8 K/UL (ref 4.1–11.1)

## 2023-10-30 RX ORDER — AMLODIPINE BESYLATE 5 MG/1
5 TABLET ORAL DAILY
Qty: 90 TABLET | Refills: 1 | Status: SHIPPED | OUTPATIENT
Start: 2023-10-30

## 2023-10-30 NOTE — TELEPHONE ENCOUNTER
Medication Refill Request    Savannah Patricio is requesting a refill of the following medication(s):   amLODIPine (NORVASC) 5 MG tablet  Please send refill to:      [address] [facility], [address] [place] [address] Isai Amy [phone]  [address] [place] [id number]  Phone: [phone] Fax: [phone]

## 2023-10-30 NOTE — TELEPHONE ENCOUNTER
Patient requesting refill on     Requested Prescriptions     Pending Prescriptions Disp Refills    amLODIPine (NORVASC) 5 MG tablet 90 tablet 1     Sig: Take 1 tablet by mouth daily        Last OV 9/26/2023

## 2023-11-30 RX ORDER — SIMVASTATIN 20 MG
20 TABLET ORAL NIGHTLY
Qty: 30 TABLET | Refills: 5 | Status: SHIPPED | OUTPATIENT
Start: 2023-11-30

## 2023-11-30 RX ORDER — LISINOPRIL 20 MG/1
20 TABLET ORAL DAILY
Qty: 90 TABLET | Refills: 1 | Status: SHIPPED | OUTPATIENT
Start: 2023-11-30

## 2023-11-30 NOTE — TELEPHONE ENCOUNTER
Medication Refill Request    Clarke Barnes is requesting a refill of the following medication(s):     simvastatin (ZOCOR) 20 MG tablet     lisinopril (PRINIVIL;ZESTRIL) 20 MG tablet     Please send refill to:      [address] [place] [address], [address] [place] [address] Pedro Luis Jacobs [phone]  [address] [place] [id number]  Phone: [phone] Fax: [phone]

## 2024-03-06 ENCOUNTER — OFFICE VISIT (OUTPATIENT)
Age: 79
End: 2024-03-06
Payer: MEDICARE

## 2024-03-06 VITALS
BODY MASS INDEX: 25.71 KG/M2 | RESPIRATION RATE: 16 BRPM | DIASTOLIC BLOOD PRESSURE: 76 MMHG | HEIGHT: 66 IN | HEART RATE: 74 BPM | WEIGHT: 160 LBS | OXYGEN SATURATION: 98 % | SYSTOLIC BLOOD PRESSURE: 124 MMHG

## 2024-03-06 DIAGNOSIS — M79.2 NEUROPATHIC PAIN: ICD-10-CM

## 2024-03-06 DIAGNOSIS — G62.1 ALCOHOLIC POLYNEUROPATHY (HCC): Primary | ICD-10-CM

## 2024-03-06 DIAGNOSIS — I65.23 OCCLUSION AND STENOSIS OF BILATERAL CAROTID ARTERIES: ICD-10-CM

## 2024-03-06 DIAGNOSIS — R26.9 GAIT ABNORMALITY: ICD-10-CM

## 2024-03-06 PROCEDURE — G8484 FLU IMMUNIZE NO ADMIN: HCPCS | Performed by: PSYCHIATRY & NEUROLOGY

## 2024-03-06 PROCEDURE — G8427 DOCREV CUR MEDS BY ELIG CLIN: HCPCS | Performed by: PSYCHIATRY & NEUROLOGY

## 2024-03-06 PROCEDURE — 1036F TOBACCO NON-USER: CPT | Performed by: PSYCHIATRY & NEUROLOGY

## 2024-03-06 PROCEDURE — 3074F SYST BP LT 130 MM HG: CPT | Performed by: PSYCHIATRY & NEUROLOGY

## 2024-03-06 PROCEDURE — 99214 OFFICE O/P EST MOD 30 MIN: CPT | Performed by: PSYCHIATRY & NEUROLOGY

## 2024-03-06 PROCEDURE — 3078F DIAST BP <80 MM HG: CPT | Performed by: PSYCHIATRY & NEUROLOGY

## 2024-03-06 PROCEDURE — G8419 CALC BMI OUT NRM PARAM NOF/U: HCPCS | Performed by: PSYCHIATRY & NEUROLOGY

## 2024-03-06 PROCEDURE — 1123F ACP DISCUSS/DSCN MKR DOCD: CPT | Performed by: PSYCHIATRY & NEUROLOGY

## 2024-03-06 ASSESSMENT — PATIENT HEALTH QUESTIONNAIRE - PHQ9
SUM OF ALL RESPONSES TO PHQ9 QUESTIONS 1 & 2: 0
2. FEELING DOWN, DEPRESSED OR HOPELESS: 0
SUM OF ALL RESPONSES TO PHQ QUESTIONS 1-9: 0
1. LITTLE INTEREST OR PLEASURE IN DOING THINGS: 0
SUM OF ALL RESPONSES TO PHQ QUESTIONS 1-9: 0

## 2024-03-06 NOTE — PROGRESS NOTES
1. Have you been to the ER, urgent care clinic since your last visit?  Hospitalized since your last visit?  No.    2. Have you seen or consulted any other health care providers outside of the [facility] since your last visit?  Include any pap smears or colon screening.   Dx with wet MD in January and sees his eye dr on regular basis.        Chief Complaint   Patient presents with    Neurologic Problem     Follow up on Neuropathy- pt denies any symptoms       
is normal. There is a 4 mm focus  of susceptibility within the right parietal white matter; there is a probable  peripheral hemosiderin ring. The gray-white matter differentiation is otherwise  well-preserved. The cervicomedullary junction is normal and there is no  tonsillar ectopia.  The visualized vascular flow-voids of the skull base are  normal. There is no herniation.    Impression  IMPRESSION:  1. No acute infarct.  2. 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      Results from Hospital Encounter encounter on 10/17/19    CT HEAD WO CONT    Narrative  EXAM: CT HEAD WO CONT    INDICATION: unsteady gait    COMPARISON: None.    CONTRAST: None.    TECHNIQUE: Unenhanced CT of the head was performed using 5 mm images. Brain and  bone windows were generated.  CT dose reduction was achieved through use of a  standardized protocol tailored for this examination and automatic exposure  control for dose modulation.    FINDINGS:  The ventricles and sulci are normal in size, shape and configuration and  midline. There is no significant white matter disease. There is no intracranial  hemorrhage, extra-axial collection, mass, mass effect or midline shift.  The  basilar cisterns are open. No acute infarct is identified. The bone windows  demonstrate no abnormalities. The visualized portions of the paranasal sinuses  and mastoid air cells are clear. Severe bilateral vertebral artery  calcification.    Impression  IMPRESSION:    No acute intracranial process seen.  Severe bilateral vertebral artery calcification can be associated with  vertebrobasilar insufficiency syndrome.        Assessment and Plan:   This is a pleasant gentleman with multiple medical problems which impacts his neurological health who returns to clinic for his peripheral neuropathy, prior tremor, vascular disease.  His neurological examination is notable for a rather severe distal length dependent sensorimotor neuropathy.  His examination, however,

## 2024-04-23 RX ORDER — AMLODIPINE BESYLATE 5 MG/1
5 TABLET ORAL DAILY
Qty: 90 TABLET | Refills: 1 | Status: SHIPPED | OUTPATIENT
Start: 2024-04-23

## 2024-05-31 NOTE — TELEPHONE ENCOUNTER
Patient requesting refill on     Requested Prescriptions     Pending Prescriptions Disp Refills    simvastatin (ZOCOR) 20 MG tablet [Pharmacy Med Name: simvastatin 20 mg tablet] 30 tablet 5     Sig: TAKE 1 TABLET BY MOUTH EVERY NIGHT    lisinopril (PRINIVIL;ZESTRIL) 20 MG tablet [Pharmacy Med Name: lisinopril 20 mg tablet] 90 tablet 5     Sig: TAKE 1 TABLET BY MOUTH ONCE DAILY        Last OV 9/26/2023

## 2024-06-02 RX ORDER — SIMVASTATIN 20 MG
20 TABLET ORAL NIGHTLY
Qty: 30 TABLET | Refills: 5 | Status: SHIPPED | OUTPATIENT
Start: 2024-06-02

## 2024-06-02 RX ORDER — LISINOPRIL 20 MG/1
20 TABLET ORAL DAILY
Qty: 90 TABLET | Refills: 5 | Status: SHIPPED | OUTPATIENT
Start: 2024-06-02

## 2024-09-16 DIAGNOSIS — I65.23 OCCLUSION AND STENOSIS OF BILATERAL CAROTID ARTERIES: Primary | ICD-10-CM

## 2024-09-16 DIAGNOSIS — Z86.73 HX-TIA (TRANSIENT ISCHEMIC ATTACK): ICD-10-CM

## 2024-10-22 ENCOUNTER — OFFICE VISIT (OUTPATIENT)
Age: 79
End: 2024-10-22
Payer: MEDICARE

## 2024-10-22 VITALS
HEART RATE: 72 BPM | HEIGHT: 65 IN | WEIGHT: 151 LBS | TEMPERATURE: 98.1 F | SYSTOLIC BLOOD PRESSURE: 112 MMHG | RESPIRATION RATE: 18 BRPM | OXYGEN SATURATION: 96 % | BODY MASS INDEX: 25.16 KG/M2 | DIASTOLIC BLOOD PRESSURE: 60 MMHG

## 2024-10-22 DIAGNOSIS — R97.20 ELEVATED PSA: ICD-10-CM

## 2024-10-22 DIAGNOSIS — N30.01 ACUTE CYSTITIS WITH HEMATURIA: ICD-10-CM

## 2024-10-22 DIAGNOSIS — E78.00 PURE HYPERCHOLESTEROLEMIA, UNSPECIFIED: ICD-10-CM

## 2024-10-22 DIAGNOSIS — M35.3 POLYMYALGIA RHEUMATICA (HCC): ICD-10-CM

## 2024-10-22 DIAGNOSIS — I10 ESSENTIAL (PRIMARY) HYPERTENSION: ICD-10-CM

## 2024-10-22 DIAGNOSIS — Z00.00 ENCOUNTER FOR MEDICARE ANNUAL WELLNESS EXAM: Primary | ICD-10-CM

## 2024-10-22 DIAGNOSIS — R35.1 NOCTURIA: ICD-10-CM

## 2024-10-22 PROBLEM — F10.21 ALCOHOL USE DISORDER, MODERATE, IN EARLY REMISSION (HCC): Status: RESOLVED | Noted: 2019-10-23 | Resolved: 2024-10-22

## 2024-10-22 LAB
BILIRUBIN, URINE, POC: NEGATIVE
BLOOD URINE, POC: ABNORMAL
GLUCOSE URINE, POC: NEGATIVE
KETONES, URINE, POC: ABNORMAL
LEUKOCYTE ESTERASE, URINE, POC: ABNORMAL
NITRITE, URINE, POC: POSITIVE
PH, URINE, POC: 7 (ref 4.6–8)
PROTEIN,URINE, POC: ABNORMAL
SPECIFIC GRAVITY, URINE, POC: 1.01 (ref 1–1.03)
URINALYSIS CLARITY, POC: CLEAR
URINALYSIS COLOR, POC: YELLOW
UROBILINOGEN, POC: ABNORMAL

## 2024-10-22 PROCEDURE — 36415 COLL VENOUS BLD VENIPUNCTURE: CPT | Performed by: NURSE PRACTITIONER

## 2024-10-22 PROCEDURE — 1036F TOBACCO NON-USER: CPT | Performed by: NURSE PRACTITIONER

## 2024-10-22 PROCEDURE — G8427 DOCREV CUR MEDS BY ELIG CLIN: HCPCS | Performed by: NURSE PRACTITIONER

## 2024-10-22 PROCEDURE — 99214 OFFICE O/P EST MOD 30 MIN: CPT | Performed by: NURSE PRACTITIONER

## 2024-10-22 PROCEDURE — 3078F DIAST BP <80 MM HG: CPT | Performed by: NURSE PRACTITIONER

## 2024-10-22 PROCEDURE — 81003 URINALYSIS AUTO W/O SCOPE: CPT | Performed by: NURSE PRACTITIONER

## 2024-10-22 PROCEDURE — 3074F SYST BP LT 130 MM HG: CPT | Performed by: NURSE PRACTITIONER

## 2024-10-22 PROCEDURE — G8484 FLU IMMUNIZE NO ADMIN: HCPCS | Performed by: NURSE PRACTITIONER

## 2024-10-22 PROCEDURE — G0439 PPPS, SUBSEQ VISIT: HCPCS | Performed by: NURSE PRACTITIONER

## 2024-10-22 PROCEDURE — 1123F ACP DISCUSS/DSCN MKR DOCD: CPT | Performed by: NURSE PRACTITIONER

## 2024-10-22 PROCEDURE — G8419 CALC BMI OUT NRM PARAM NOF/U: HCPCS | Performed by: NURSE PRACTITIONER

## 2024-10-22 RX ORDER — CIPROFLOXACIN 250 MG/1
250 TABLET, FILM COATED ORAL 2 TIMES DAILY
Qty: 20 TABLET | Refills: 0 | Status: SHIPPED | OUTPATIENT
Start: 2024-10-22 | End: 2024-11-01

## 2024-10-22 SDOH — ECONOMIC STABILITY: FOOD INSECURITY: WITHIN THE PAST 12 MONTHS, THE FOOD YOU BOUGHT JUST DIDN'T LAST AND YOU DIDN'T HAVE MONEY TO GET MORE.: NEVER TRUE

## 2024-10-22 SDOH — ECONOMIC STABILITY: FOOD INSECURITY: WITHIN THE PAST 12 MONTHS, YOU WORRIED THAT YOUR FOOD WOULD RUN OUT BEFORE YOU GOT MONEY TO BUY MORE.: NEVER TRUE

## 2024-10-22 SDOH — ECONOMIC STABILITY: INCOME INSECURITY: HOW HARD IS IT FOR YOU TO PAY FOR THE VERY BASICS LIKE FOOD, HOUSING, MEDICAL CARE, AND HEATING?: NOT HARD AT ALL

## 2024-10-22 ASSESSMENT — PATIENT HEALTH QUESTIONNAIRE - PHQ9
SUM OF ALL RESPONSES TO PHQ QUESTIONS 1-9: 0
2. FEELING DOWN, DEPRESSED OR HOPELESS: NOT AT ALL
SUM OF ALL RESPONSES TO PHQ QUESTIONS 1-9: 0
SUM OF ALL RESPONSES TO PHQ QUESTIONS 1-9: 0
1. LITTLE INTEREST OR PLEASURE IN DOING THINGS: NOT AT ALL
SUM OF ALL RESPONSES TO PHQ QUESTIONS 1-9: 0
SUM OF ALL RESPONSES TO PHQ9 QUESTIONS 1 & 2: 0

## 2024-10-22 ASSESSMENT — LIFESTYLE VARIABLES: HOW MANY STANDARD DRINKS CONTAINING ALCOHOL DO YOU HAVE ON A TYPICAL DAY: PATIENT DOES NOT DRINK

## 2024-10-22 NOTE — PATIENT INSTRUCTIONS
information.      Personalized Preventive Plan for Gerardo Post - 10/22/2024  Medicare offers a range of preventive health benefits. Some of the tests and screenings are paid in full while other may be subject to a deductible, co-insurance, and/or copay.    Some of these benefits include a comprehensive review of your medical history including lifestyle, illnesses that may run in your family, and various assessments and screenings as appropriate.    After reviewing your medical record and screening and assessments performed today your provider may have ordered immunizations, labs, imaging, and/or referrals for you.  A list of these orders (if applicable) as well as your Preventive Care list are included within your After Visit Summary for your review.    Other Preventive Recommendations:    A preventive eye exam performed by an eye specialist is recommended every 1-2 years to screen for glaucoma; cataracts, macular degeneration, and other eye disorders.  A preventive dental visit is recommended every 6 months.  Try to get at least 150 minutes of exercise per week or 10,000 steps per day on a pedometer .  Order or download the FREE \"Exercise & Physical Activity: Your Everyday Guide\" from [employer]. Call 1-969.933.4311 or search [employer] online.  You need 2428-2960 mg of calcium and 9858-8107 IU of vitamin D per day. It is possible to meet your calcium requirement with diet alone, but a vitamin D supplement is usually necessary to meet this goal.  When exposed to the sun, use a sunscreen that protects against both UVA and UVB radiation with an SPF of 30 or greater. Reapply every 2 to 3 hours or after sweating, drying off with a towel, or swimming.  Always wear a seat belt when traveling in a car. Always wear a helmet when riding a bicycle or motorcycle.

## 2024-10-22 NOTE — PROGRESS NOTES
\"Have you been to the ER, urgent care clinic since your last visit?  Hospitalized since your last visit?\"    NO    “Have you seen or consulted any other health care providers outside of [facility] since your last visit?”    Yes ophthalmologist MARIA C Deluca eye injection             Click Here for Release of Records Request      Chief Complaint   Patient presents with    Medicare AWV         Vitals:    10/22/24 0743   BP: 112/60   Pulse: 72   Resp: 18   Temp: 98.1 °F (36.7 °C)   SpO2: 96%

## 2024-10-22 NOTE — PROGRESS NOTES
Subjective:     Gerardo Post is a 78 y.o. male who presents today with the following:  Chief Complaint   Patient presents with    Medicare AWV       Patient Active Problem List   Diagnosis    Encounter for immunization    Left ventricular ejection fraction greater than 40%    Gait abnormality    Bilateral carotid artery stenosis    Hypercholesterolemia    Hx of colonic polyp    Hypertension    Diverticula of colon    Status post left hip replacement    BMI 27.0-27.9,adult    Vertebrobasilar artery insufficiency    PMR (polymyalgia rheumatica) (HCC)    Alcoholic polyneuropathy (HCC)       COMPLIANT WITH MEDICATION:   HTN; Denies chest pain, dyspnea, palpitations, headache and blurred vision. Blood pressure normotensive. Well controlled with lisinopril (PRINIVIL;ZESTRIL) 20 MG tablet, TAKE 1 TABLET BY MOUTH ONCE DAILY, and amLODIPine (NORVASC) 5 MG tablet, TAKE 1 TABLET BY MOUTH ONCE DAILY    Pure Hypercholesterolemia:  Well controlled with simvastatin (ZOCOR) 20 MG tablet, TAKE 1 TABLET BY MOUTH EVERY NIGHT. Mr. Post endorses maintaining a well managed diet with frequent intake of Fruit, Yogurt, Boiled eggs and occasionally Sausage in moderation.     Nocturia: Mr. Post reports that his urinary frequency has increased from 2-3 times nightly to approximately 6-7 times nightly since July 2024. He also endorses a sensation of incomplete bladder emptying.     Acute cystitis with hematuria: POC UA performed in office reveals Trace blood, Trace protein, + Nitrites and Large Leukocyte Esterase. Urine will be sent for a culture and Mr. Post is receptive to initiating treatment prior to receiving culture results. Orders placed for Cipro 250mg 1 tablet PO twice daily for 10 days.    depression screening addressed not at risk    abuse screening addressed denies    learning assessment addressed reviewed nurses notes    fall risk addressed not at risk    HM: addressed. Immunizations declined at this time.

## 2024-10-23 LAB
ALBUMIN SERPL-MCNC: 3.8 G/DL (ref 3.5–5)
ALBUMIN/GLOB SERPL: 1.2 (ref 1.1–2.2)
ALP SERPL-CCNC: 76 U/L (ref 45–117)
ALT SERPL-CCNC: 26 U/L (ref 12–78)
ANION GAP SERPL CALC-SCNC: 4 MMOL/L (ref 2–12)
AST SERPL-CCNC: 18 U/L (ref 15–37)
BASOPHILS # BLD: 0 K/UL (ref 0–0.1)
BASOPHILS NFR BLD: 0 % (ref 0–1)
BILIRUB SERPL-MCNC: 0.5 MG/DL (ref 0.2–1)
BUN SERPL-MCNC: 9 MG/DL (ref 6–20)
BUN/CREAT SERPL: 13 (ref 12–20)
CALCIUM SERPL-MCNC: 9 MG/DL (ref 8.5–10.1)
CHLORIDE SERPL-SCNC: 104 MMOL/L (ref 97–108)
CHOLEST SERPL-MCNC: 171 MG/DL
CO2 SERPL-SCNC: 29 MMOL/L (ref 21–32)
CREAT SERPL-MCNC: 0.71 MG/DL (ref 0.7–1.3)
DIFFERENTIAL METHOD BLD: NORMAL
EOSINOPHIL # BLD: 0 K/UL (ref 0–0.4)
EOSINOPHIL NFR BLD: 1 % (ref 0–7)
ERYTHROCYTE [DISTWIDTH] IN BLOOD BY AUTOMATED COUNT: 12.9 % (ref 11.5–14.5)
GLOBULIN SER CALC-MCNC: 3.1 G/DL (ref 2–4)
GLUCOSE SERPL-MCNC: 102 MG/DL (ref 65–100)
HCT VFR BLD AUTO: 45.8 % (ref 36.6–50.3)
HDLC SERPL-MCNC: 59 MG/DL
HDLC SERPL: 2.9 (ref 0–5)
HGB BLD-MCNC: 14.3 G/DL (ref 12.1–17)
IMM GRANULOCYTES # BLD AUTO: 0 K/UL (ref 0–0.04)
IMM GRANULOCYTES NFR BLD AUTO: 0 % (ref 0–0.5)
LDLC SERPL CALC-MCNC: 94.4 MG/DL (ref 0–100)
LYMPHOCYTES # BLD: 1.3 K/UL (ref 0.8–3.5)
LYMPHOCYTES NFR BLD: 21 % (ref 12–49)
MCH RBC QN AUTO: 29.6 PG (ref 26–34)
MCHC RBC AUTO-ENTMCNC: 31.2 G/DL (ref 30–36.5)
MCV RBC AUTO: 94.8 FL (ref 80–99)
MONOCYTES # BLD: 0.5 K/UL (ref 0–1)
MONOCYTES NFR BLD: 8 % (ref 5–13)
NEUTS SEG # BLD: 4.4 K/UL (ref 1.8–8)
NEUTS SEG NFR BLD: 70 % (ref 32–75)
NRBC # BLD: 0 K/UL (ref 0–0.01)
NRBC BLD-RTO: 0 PER 100 WBC
PLATELET # BLD AUTO: 247 K/UL (ref 150–400)
PMV BLD AUTO: 10 FL (ref 8.9–12.9)
POTASSIUM SERPL-SCNC: 4.1 MMOL/L (ref 3.5–5.1)
PROT SERPL-MCNC: 6.9 G/DL (ref 6.4–8.2)
PSA SERPL-MCNC: 5.1 NG/ML (ref 0.01–4)
RBC # BLD AUTO: 4.83 M/UL (ref 4.1–5.7)
SODIUM SERPL-SCNC: 137 MMOL/L (ref 136–145)
TRIGL SERPL-MCNC: 88 MG/DL
VLDLC SERPL CALC-MCNC: 17.6 MG/DL
WBC # BLD AUTO: 6.3 K/UL (ref 4.1–11.1)

## 2024-10-24 LAB
BACTERIA SPEC CULT: ABNORMAL
CC UR VC: ABNORMAL
SERVICE CMNT-IMP: ABNORMAL

## 2024-10-27 RX ORDER — AMLODIPINE BESYLATE 5 MG/1
5 TABLET ORAL DAILY
Qty: 90 TABLET | Refills: 1 | Status: SHIPPED | OUTPATIENT
Start: 2024-10-27

## 2024-10-31 ENCOUNTER — TRANSCRIBE ORDERS (OUTPATIENT)
Facility: HOSPITAL | Age: 79
End: 2024-10-31

## 2024-10-31 DIAGNOSIS — R33.9 RETENTION OF URINE, UNSPECIFIED: Primary | ICD-10-CM

## 2024-11-06 ENCOUNTER — HOSPITAL ENCOUNTER (OUTPATIENT)
Facility: HOSPITAL | Age: 79
Discharge: HOME | End: 2024-11-09
Payer: MEDICARE

## 2024-11-06 DIAGNOSIS — R33.9 RETENTION OF URINE, UNSPECIFIED: ICD-10-CM

## 2024-11-06 PROCEDURE — 51798 US URINE CAPACITY MEASURE: CPT

## 2024-12-01 RX ORDER — SIMVASTATIN 20 MG
20 TABLET ORAL NIGHTLY
Qty: 30 TABLET | Refills: 5 | Status: SHIPPED | OUTPATIENT
Start: 2024-12-01

## 2025-03-24 ENCOUNTER — OFFICE VISIT (OUTPATIENT)
Age: 80
End: 2025-03-24
Payer: MEDICARE

## 2025-03-24 VITALS
HEART RATE: 65 BPM | WEIGHT: 151 LBS | DIASTOLIC BLOOD PRESSURE: 56 MMHG | OXYGEN SATURATION: 97 % | HEIGHT: 65 IN | SYSTOLIC BLOOD PRESSURE: 112 MMHG | BODY MASS INDEX: 25.16 KG/M2 | RESPIRATION RATE: 18 BRPM

## 2025-03-24 DIAGNOSIS — R26.9 GAIT ABNORMALITY: ICD-10-CM

## 2025-03-24 DIAGNOSIS — G60.8 PERIPHERAL SENSORY NEUROPATHY: Primary | ICD-10-CM

## 2025-03-24 DIAGNOSIS — I65.23 BILATERAL CAROTID ARTERY STENOSIS: ICD-10-CM

## 2025-03-24 PROCEDURE — 1126F AMNT PAIN NOTED NONE PRSNT: CPT | Performed by: NURSE PRACTITIONER

## 2025-03-24 PROCEDURE — 1159F MED LIST DOCD IN RCRD: CPT | Performed by: NURSE PRACTITIONER

## 2025-03-24 PROCEDURE — 1160F RVW MEDS BY RX/DR IN RCRD: CPT | Performed by: NURSE PRACTITIONER

## 2025-03-24 PROCEDURE — 3078F DIAST BP <80 MM HG: CPT | Performed by: NURSE PRACTITIONER

## 2025-03-24 PROCEDURE — 1123F ACP DISCUSS/DSCN MKR DOCD: CPT | Performed by: NURSE PRACTITIONER

## 2025-03-24 PROCEDURE — 99214 OFFICE O/P EST MOD 30 MIN: CPT | Performed by: NURSE PRACTITIONER

## 2025-03-24 PROCEDURE — 3074F SYST BP LT 130 MM HG: CPT | Performed by: NURSE PRACTITIONER

## 2025-03-24 PROCEDURE — G8419 CALC BMI OUT NRM PARAM NOF/U: HCPCS | Performed by: NURSE PRACTITIONER

## 2025-03-24 PROCEDURE — G8427 DOCREV CUR MEDS BY ELIG CLIN: HCPCS | Performed by: NURSE PRACTITIONER

## 2025-03-24 PROCEDURE — 1036F TOBACCO NON-USER: CPT | Performed by: NURSE PRACTITIONER

## 2025-03-24 RX ORDER — TAMSULOSIN HYDROCHLORIDE 0.4 MG/1
0.4 CAPSULE ORAL DAILY
COMMUNITY
Start: 2025-02-24

## 2025-03-24 ASSESSMENT — PATIENT HEALTH QUESTIONNAIRE - PHQ9
SUM OF ALL RESPONSES TO PHQ QUESTIONS 1-9: 0
SUM OF ALL RESPONSES TO PHQ QUESTIONS 1-9: 0
1. LITTLE INTEREST OR PLEASURE IN DOING THINGS: NOT AT ALL
2. FEELING DOWN, DEPRESSED OR HOPELESS: NOT AT ALL
SUM OF ALL RESPONSES TO PHQ QUESTIONS 1-9: 0
SUM OF ALL RESPONSES TO PHQ QUESTIONS 1-9: 0

## 2025-03-24 NOTE — PROGRESS NOTES
[facility]  [address]  [address]  [place] [address]  Tel: [phone]  Fax: [phone]      Date:  25     Name:  RACHNA HARTLEY  :  1945  MRN:  [id number]     PCP:  Bambi Fernandez, ALLEN Brown NP    Chief Complaint   Patient presents with    Neurologic Problem     Follow up for neuropathy - Both feet - about the same        HISTORY OF PRESENT ILLNESS:  History of Present Illness  The patient is an 79-year-old male who presents today for a regular follow-up. He has a notable history of neuropathy and was last seen by Dr. Ricks. He also has a history of hypertension, dyslipidemia, and longstanding alcohol use, which led to a length-dependent, primarily axonal sensory neuropathy.    An active lifestyle is maintained, though he acknowledges the need for increased physical activity such as walking. In 2024, he experienced a fall at his niece's place in [place], resulting in discomfort in his artificial hip replacement, but did not seek medical attention at that time. Numbness is reported along the outer aspect of the right foot, which improves with physical activity. A perceived decrease in muscle mass due to lack of exercise is also mentioned. Current alcohol consumption is limited to one beer per day, a significant reduction from previous intake, with abstinence from wine and hard liquor. A healthy diet is adhered to, particularly in the morning, including mixed nuts, yogurt, fruits, and half a boiled egg, along with marcos and toast.    He is currently on Flomax, prescribed by his urologist, which has effectively reduced nighttime urination frequency. Typically, he sleeps for approximately 4 to 5 hours per night.    The use of baby aspirin has been discontinued, citing a lack of perceived benefit. No history of stroke or transient ischemic attack (TIA) is reported.        Recap from last visit :   1. Severe peripheral neuropathy:  emg revealed a primarily

## 2025-03-24 NOTE — PROGRESS NOTES
Chief Complaint   Patient presents with    Neurologic Problem     Follow up for neuropathy - Both feet - about the same      1. Have you been to the ER, urgent care clinic since your last visit?  Hospitalized since your last visit? No     2. Have you seen or consulted any other health care providers outside of the [facility] since your last visit?  Include any pap smears or colon screening.  Yes Dermatologist and plastic surgeon

## 2025-04-25 RX ORDER — AMLODIPINE BESYLATE 5 MG/1
5 TABLET ORAL DAILY
Qty: 90 TABLET | Refills: 1 | Status: SHIPPED | OUTPATIENT
Start: 2025-04-25

## 2025-05-20 RX ORDER — SIMVASTATIN 20 MG
20 TABLET ORAL NIGHTLY
Qty: 30 TABLET | Refills: 5 | Status: SHIPPED | OUTPATIENT
Start: 2025-05-20

## 2025-08-26 RX ORDER — LISINOPRIL 20 MG/1
20 TABLET ORAL DAILY
Qty: 90 TABLET | Refills: 5 | Status: SHIPPED | OUTPATIENT
Start: 2025-08-26